# Patient Record
Sex: FEMALE | Race: BLACK OR AFRICAN AMERICAN | NOT HISPANIC OR LATINO | Employment: OTHER | URBAN - METROPOLITAN AREA
[De-identification: names, ages, dates, MRNs, and addresses within clinical notes are randomized per-mention and may not be internally consistent; named-entity substitution may affect disease eponyms.]

---

## 2019-05-02 ENCOUNTER — TRANSFERRED RECORDS (OUTPATIENT)
Dept: HEALTH INFORMATION MANAGEMENT | Facility: CLINIC | Age: 69
End: 2019-05-02

## 2019-05-06 ENCOUNTER — TRANSFERRED RECORDS (OUTPATIENT)
Dept: HEALTH INFORMATION MANAGEMENT | Facility: CLINIC | Age: 69
End: 2019-05-06

## 2019-05-08 ENCOUNTER — TELEPHONE (OUTPATIENT)
Dept: OPHTHALMOLOGY | Facility: CLINIC | Age: 69
End: 2019-05-08

## 2019-05-08 NOTE — TELEPHONE ENCOUNTER
[facility]    Phone Message    May a detailed message be left on voicemail: yes    Reason for Call: Other: Jeni  just wants to notify clinic that pt needs to know ahead of time her hosp f/u so that she can set up transportation.     Action Taken: Message routed to:  [facility]

## 2019-05-08 NOTE — TELEPHONE ENCOUNTER
[facility]    Phone Message    May a detailed message be left on voicemail: yes    Reason for Call: Other: per pt - stated [place] [facility]ved for high pressures in her eyes, please call pt to schedule a hospital f/u asap thanks     Action Taken: Message routed to:  [facility]

## 2019-05-08 NOTE — TELEPHONE ENCOUNTER
Note to facilitator to take into consideration transportation needs when scheduling    Note to facilitator to review intolerances pt having with current treatment also to review with MD's    Jose Hernandez RN 4:49 PM 05/08/19          [facility]    Phone Message    May a detailed message be left on voicemail: yes    Reason for Call: Other: Pt wishes us to know that she needs to use a transportation company and will need a couple days warnig to schedule anything.  Pt believes we have not been calling her back. Pt complains her medications are no longer handling the pain and she is having allergic reactions to them but her [facility] doctor tells her she has to keep taking the.  Please contact Pt ASAP     Action Taken: Message routed to:  [facility] eye clinic

## 2019-05-08 NOTE — TELEPHONE ENCOUNTER
Facilitator aware of referral and previously spoke to referring clinic  Will review notes with MD and call pt to schedule  Jose Hernandez RN 4:04 PM 05/08/19

## 2019-05-09 NOTE — TELEPHONE ENCOUNTER
[facility]    Phone Message    May a detailed message be left on voicemail: yes    Reason for Call: Other: Jeni from [facility] called again to FU on Pt scheduling.  They are all VERY anxious due to the extra added time for scheduling transport     Action Taken: Message routed to:  [facility] clinic

## 2019-05-09 NOTE — TELEPHONE ENCOUNTER
Dr. Martin as spoken with [facility] and working on plan for surgery    Jose Hernandez RN 11:46 AM 05/09/19

## 2019-05-09 NOTE — TELEPHONE ENCOUNTER
[facility] Call [facility]    Phone Message    May a detailed message be left on voicemail: no -  Per Richard Care Coordinator at [facility] if unable to reach her at cell phone number [phone], please try [phone] or [phone].     Reason for Call: Other: Richard Care Coordinator at [facility] for Pt is calling to see if there is an update on appointment/surgery for Pt. Please call Pt back to give her an update.      Action Taken: Message routed to:  [facility] Clinic

## 2019-05-10 ENCOUNTER — TELEPHONE (OUTPATIENT)
Dept: OPHTHALMOLOGY | Facility: CLINIC | Age: 69
End: 2019-05-10

## 2019-05-10 NOTE — TELEPHONE ENCOUNTER
[facility] patient - was referred to Mario via Epic messages (from Triage via multiple calls from the patient/family and their insurance), and via phone calls directly to the surgeon from [facility] staff.    Time is being held at the [facility] on 5/22 (11:15 - 12:15) for a poss phaco/tube to be done; this will be followed by a 1 day post op (on campus), and then patient will be re-referred back to [facility] for continuing care.    Daughter was called this morning to let her know the date for the surgery.  She mentioned to call her mother with all of the details - so that she can decide, and then could call her to discuss details.  Daughter has my direct line.    A long voicemail was also left on patient's phone this morning with all of the details regarding date/time of surgery, 1 day post op will be needed, and a preop will be needed.      [facility] has the patient coming in today to get IOL calcs/testing done.  I have scanned and emailed the surgery packet to Ashleigh Eddy, to give to the family/patient.      Mario will meet with the patient the morning of surgery, at the [facility], as they are not coming to our clinic prior to the surgery date.

## 2019-05-28 ENCOUNTER — TELEPHONE (OUTPATIENT)
Dept: OPHTHALMOLOGY | Facility: CLINIC | Age: 69
End: 2019-05-28

## 2019-05-28 NOTE — TELEPHONE ENCOUNTER
Pt seen at [facility] ED last night  Seizure last night    IOP ok per pt at visit     S/p tube glaucoma surgery   [facility] pt  Glaucoma surgery note Not in system    Pt provided will percocet yesterday--10 pills      Pt was having eye pain last night-- unsure if cup pt was holding hit eye   Increase pain this AM-- percocet helped only temporary  Vision worse today per pt  Recommend evaluation today    Reviewed with Dr. Martin-- surgery last weds  [facility] pt and follow up care with [facility]    Spoke to [facility] ([facility] spoke to pt this AM also) who will contact pt about appt this afternoon and assist in transportation request-- urgent request to transportation  [facility] has direct triage number for further assistance.  Jose Hernandez RN 8:50 AM 05/28/19    Note to Dr. Martin/Dr. Linda

## 2019-05-28 NOTE — TELEPHONE ENCOUNTER
Patient called reporting red-flag symptom: Pain after surgery    Per the P Red-Flag symptom list, patient was: refused to dial 911.  Will send message to clinic team as an FYI.    Pt requested a call back   [phone]    Excessive Pain - could barely speak

## 2019-08-13 ENCOUNTER — TRANSFERRED RECORDS (OUTPATIENT)
Dept: HEALTH INFORMATION MANAGEMENT | Facility: CLINIC | Age: 69
End: 2019-08-13

## 2019-12-09 ENCOUNTER — OFFICE VISIT (OUTPATIENT)
Dept: INTERNAL MEDICINE | Facility: CLINIC | Age: 69
End: 2019-12-09
Payer: COMMERCIAL

## 2019-12-09 VITALS
HEART RATE: 86 BPM | WEIGHT: 176.3 LBS | OXYGEN SATURATION: 98 % | TEMPERATURE: 98.5 F | DIASTOLIC BLOOD PRESSURE: 90 MMHG | SYSTOLIC BLOOD PRESSURE: 164 MMHG | HEIGHT: 62 IN | RESPIRATION RATE: 18 BRPM | BODY MASS INDEX: 32.44 KG/M2

## 2019-12-09 DIAGNOSIS — F19.10 POLYSUBSTANCE ABUSE (H): ICD-10-CM

## 2019-12-09 DIAGNOSIS — F11.90 CHRONIC NARCOTIC USE: Primary | ICD-10-CM

## 2019-12-09 PROCEDURE — 99203 OFFICE O/P NEW LOW 30 MIN: CPT | Performed by: INTERNAL MEDICINE

## 2019-12-09 RX ORDER — MELOXICAM 15 MG/1
15 TABLET ORAL DAILY
COMMUNITY
Start: 2019-12-09

## 2019-12-09 RX ORDER — ERYTHROMYCIN 5 MG/G
0.5 OINTMENT OPHTHALMIC 4 TIMES DAILY
COMMUNITY
Start: 2019-12-09

## 2019-12-09 RX ORDER — DORZOLAMIDE HYDROCHLORIDE AND TIMOLOL MALEATE 20; 5 MG/ML; MG/ML
1 SOLUTION/ DROPS OPHTHALMIC 2 TIMES DAILY
COMMUNITY
Start: 2019-12-09

## 2019-12-09 RX ORDER — LATANOPROST 50 UG/ML
1 SOLUTION/ DROPS OPHTHALMIC AT BEDTIME
COMMUNITY
Start: 2019-12-09

## 2019-12-09 RX ORDER — ACETAZOLAMIDE 500 MG/1
500 CAPSULE, EXTENDED RELEASE ORAL 2 TIMES DAILY
COMMUNITY
Start: 2019-12-09

## 2019-12-09 RX ORDER — PREDNISOLONE ACETATE 10 MG/ML
1 SUSPENSION/ DROPS OPHTHALMIC 4 TIMES DAILY
COMMUNITY
Start: 2019-12-09

## 2019-12-09 RX ORDER — GLUCOSAM/CHON-MSM1/C/MANG/BOSW 500-416.6
TABLET ORAL
COMMUNITY
Start: 2019-12-09

## 2019-12-09 RX ORDER — OXYCODONE AND ACETAMINOPHEN 5; 325 MG/1; MG/1
2 TABLET ORAL EVERY 4 HOURS PRN
Qty: 12 TABLET | Refills: 0 | Status: CANCELLED | COMMUNITY
Start: 2019-12-09

## 2019-12-09 RX ORDER — BISACODYL 10 MG
10 SUPPOSITORY, RECTAL RECTAL DAILY PRN
COMMUNITY
Start: 2017-11-20

## 2019-12-09 RX ORDER — POLYETHYLENE GLYCOL 3350 17 G/17G
1 POWDER, FOR SOLUTION ORAL DAILY
COMMUNITY
Start: 2019-12-09

## 2019-12-09 RX ORDER — NAPHAZOLINE HCL/GLYCERIN 0.012-0.2%
1 DROPS OPHTHALMIC (EYE) DAILY PRN
Refills: 0 | COMMUNITY
Start: 2019-06-07 | End: 2019-12-09

## 2019-12-09 RX ORDER — LISINOPRIL 40 MG/1
40 TABLET ORAL AT BEDTIME
COMMUNITY
Start: 2019-12-09

## 2019-12-09 RX ORDER — DOCUSATE SODIUM 100 MG/1
1 CAPSULE, LIQUID FILLED ORAL DAILY PRN
COMMUNITY

## 2019-12-09 RX ORDER — PHENYTOIN SODIUM 100 MG/1
CAPSULE, EXTENDED RELEASE ORAL
COMMUNITY
Start: 2019-12-09

## 2019-12-09 RX ORDER — OXYCODONE AND ACETAMINOPHEN 5; 325 MG/1; MG/1
2 TABLET ORAL EVERY 4 HOURS PRN
Qty: 12 TABLET | Refills: 0 | COMMUNITY
Start: 2019-12-09

## 2019-12-09 RX ORDER — ZONISAMIDE 100 MG/1
500 CAPSULE ORAL AT BEDTIME
COMMUNITY
Start: 2019-12-09

## 2019-12-09 RX ORDER — DOCUSATE SODIUM 100 MG/1
100 CAPSULE, LIQUID FILLED ORAL 2 TIMES DAILY PRN
Status: CANCELLED | COMMUNITY
Start: 2019-12-09

## 2019-12-09 RX ORDER — NAPHAZOLINE HCL/GLYCERIN 0.012-0.2%
1 DROPS OPHTHALMIC (EYE) 4 TIMES DAILY PRN
Refills: 0 | COMMUNITY
Start: 2019-12-09

## 2019-12-09 RX ORDER — LANCETS
EACH MISCELLANEOUS
COMMUNITY
Start: 2019-12-09

## 2019-12-09 RX ORDER — BRIMONIDINE TARTRATE 1 MG/ML
1 SOLUTION/ DROPS OPHTHALMIC 2 TIMES DAILY
COMMUNITY
Start: 2019-06-14

## 2019-12-09 SDOH — HEALTH STABILITY: MENTAL HEALTH: HOW OFTEN DO YOU HAVE A DRINK CONTAINING ALCOHOL?: NEVER

## 2019-12-09 ASSESSMENT — MIFFLIN-ST. JEOR: SCORE: 1282.94

## 2019-12-09 NOTE — Clinical Note
Pt had normal mammogram completed 8/13/19 through [facility]-results in Care Everywhere.

## 2019-12-09 NOTE — NURSING NOTE
"BP (!) 164/90 (BP Location: Left arm, Patient Position: Sitting, Cuff Size: Adult Large)   Pulse 86   Temp 98.5  F (36.9  C) (Oral)   Resp 18   Ht 1.575 m (5' 2\")   Wt 80 kg (176 lb 4.8 oz)   LMP  (LMP Unknown)   SpO2 98%   Breastfeeding No   BMI 32.25 kg/m    Margaret Moncada CMA    "

## 2019-12-09 NOTE — PROGRESS NOTES
"*Establish Care-She recenty moved from her apartment in with her daughter (said she wasn't safe to live home alone with her seizures). Daughter is her PCA.   -Her walker is broken, as well as her scooter. She would like Dr. Cornell to write a letter so they will fix her scooter faster?   -She reports that the Acetazolamide \"is wrong\". Says she met with a new doctor and was given this medication, but it is not the right one (did not specify why).   -Needs hospital bed-says her previous doctor ordered her one and they came to deliver it, but it was the wrong one and they had to take it back. Says she needs the right one ordered.   -Reports she has \"slight alzheimers\", has frequent seizures, hx of substance addiction (says this is why she is so careful with her oxycodone).     Subjective     Elsa Zapien is a 69 year old female who presents to clinic today for the following health issues:    HPI   Patient is here to establish care.  Patient came alone and was anxious from the beginning.  Patient has pupils of oxycodone and was worried if she could get that renewed especially with her past history of drug addiction including cocaine, heroine, alcohol abuse.  Patient says she has been sober for last 8 months.  Patient mentioned that she gets 120 pills of oxycodone to take 2 pills every 4 hours as needed in a day. That usually helps with her pain.  Patient has tried Vicodin, Tylenol 3 in the past and that did not help the pain.  When asked more about her pain she said she has knee pain and she is disabled and she needs a scooter for that.  Patient also had glaucoma surgery and has eye pain, had a tooth extraction and has stopped pain, had a head injury in the past and has headaches with that.  Patient also mentioned that she has dementia and has trouble remembering things.    Patient used to follow with Dr. Garay and and after Dr. Briones moved he was following with Dr. Rios and had difficulty getting pain " "medicine and that is why she is looking for a new primary care provider.    When I reviewed her previous drug screens for last 1 year none of the urine drug screen was positive for opiate or oxycodone.      Review of Systems   ROS COMP: CONSTITUTIONAL: NEGATIVE for fever, chills, change in weight  INTEGUMENTARY/SKIN: NEGATIVE for worrisome rashes, moles or lesions  EYES: POSITIVE for vision changes  ENT/MOUTH: NEGATIVE for ear, mouth and throat problems  RESP: NEGATIVE for significant cough or SOB  CV: NEGATIVE for chest pain, palpitations or peripheral edema  GI: NEGATIVE for nausea, abdominal pain, heartburn, or change in bowel habits  : NEGATIVE for frequency, dysuria, or hematuria  MUSCULOSKELETAL: POSITIVE  for significant knee arthralgias and back pain  NEURO: NEGATIVE for weakness, dizziness or paresthesias  ENDOCRINE: NEGATIVE for temperature intolerance, skin/hair changes  HEME: NEGATIVE for bleeding problems  PSYCHIATRIC: NEGATIVE for changes in mood or affect      Objective    BP (!) 164/90 (BP Location: Left arm, Patient Position: Sitting, Cuff Size: Adult Large)   Pulse 86   Temp 98.5  F (36.9  C) (Oral)   Resp 18   Ht 1.575 m (5' 2\")   Wt 80 kg (176 lb 4.8 oz)   LMP  (LMP Unknown)   SpO2 98%   Breastfeeding No   BMI 32.25 kg/m    Body mass index is 32.25 kg/m .  Physical Exam   GENERAL: healthy, alert and no distress  EYES: Eyes grossly normal to inspection, PERRL and conjunctivae and sclerae normal  CV: regular rate and rhythm, normal S1 S2, no S3 or S4, no murmur, click or rub, no peripheral edema and peripheral pulses strong  MS: knee: no joint line tenderness noted. ROM limited due to pain  SKIN: no suspicious lesions or rashes  NEURO: Normal strength and tone, mentation intact and speech normal  PSYCH: mentation appears normal, affect normal/bright    Diagnostic Test Results:  Labs reviewed in Epic        Assessment & Plan     Elsa was seen today for establish care.    Diagnoses and " all orders for this visit:    Chronic narcotic use    Polysubstance abuse (H)    When I reviewed her medical records from care everywhere was concerned about her use of narcotic pain medication.  I also checked her  and patient has been consistently getting around 120 pills of oxycodone and sometimes 168 pills for 21 days.  It was unclear why exactly she was using pain medication as she was going from the pain to glaucoma surgery to dental extraction for her reasons to use pain medication.  I explained in detail about the current recommendation and cutting back on the narcotic pain medication.  Also explained to her due to her age, possible dementia, addiction history, seizure narcotic pain medication can put her at risk of fall, respiratory depression, sudden death.  I also explained to her that it could be addictive.  Patient that she has been taking it for a long time and her only focus was if she would be able to get pain medications.  I was also concerned that none of the urine drug screen was positive for opioids or oxycodone in the past.  That was the reason the previous doctors stopped giving her pain medication.    I explained to her that I would not be recommending narcotic pain medication for above-mentioned reasons.  Patient said she will try to find a doctor who can give her medications and walked away.    No follow-ups on file.    Emily Cornell MD  [facility]

## 2020-06-25 ENCOUNTER — HOSPITAL ENCOUNTER (EMERGENCY)
Facility: CLINIC | Age: 70
Discharge: ANOTHER HOSPITAL | End: 2020-06-25
Attending: EMERGENCY MEDICINE | Admitting: EMERGENCY MEDICINE
Payer: COMMERCIAL

## 2020-06-25 VITALS
TEMPERATURE: 98.5 F | OXYGEN SATURATION: 99 % | RESPIRATION RATE: 20 BRPM | DIASTOLIC BLOOD PRESSURE: 82 MMHG | SYSTOLIC BLOOD PRESSURE: 120 MMHG

## 2020-06-25 DIAGNOSIS — H57.12 PAIN OF LEFT EYE: ICD-10-CM

## 2020-06-25 PROCEDURE — 93005 ELECTROCARDIOGRAM TRACING: CPT

## 2020-06-25 PROCEDURE — 99285 EMERGENCY DEPT VISIT HI MDM: CPT | Mod: 25

## 2020-06-25 RX ORDER — TETRACAINE HYDROCHLORIDE 5 MG/ML
SOLUTION OPHTHALMIC
Status: DISCONTINUED
Start: 2020-06-25 | End: 2020-06-25 | Stop reason: HOSPADM

## 2020-06-25 ASSESSMENT — ENCOUNTER SYMPTOMS
HEADACHES: 0
SPEECH DIFFICULTY: 0
WEAKNESS: 0
FEVER: 0
NUMBNESS: 0
EYE REDNESS: 1
PHOTOPHOBIA: 1
EYE PAIN: 1

## 2020-06-25 NOTE — ED PROVIDER NOTES
"  History     Chief Complaint:  Left Eye Pain    The history is provided by the patient and medical records. The history is limited by the condition of the patient and the absence of a caregiver.      Elsa Zapien is a 69 year old female with a history of glaucoma in both eyes, as well as Alzheimer's disease, who presents for evaluation of left eye pain. Approximately two weeks ago, the patient was diagnosed with a corneal abrasion in her right eye and she was started on multiple eye drops, including erythromycin ointment, at that time. Two days ago, she had an appointment with her ophthalmologist at [facility] and the abrasion was found to be healed without infection, however with a subconjunctival hemorrhage; at that time, she was endorsing pain and blurry vision in her right eye, but her left eye was stable. (per the clinic notes: her IOPs were 18 right eye 20 left eye that day, however her glaucoma was not the primary concern). This morning, the patient reports the onset of pain in her left eye. She describes a shooting pain, as well as blurry vision and photophobia. She took Tylenol and oxycodone earlier in the day, but neither provided relief of her symptoms. Given this persistent pain, she called her ophthalmology clinic and the nurse line recommended she present to the ED immediately. Her daughter, her PCA, drove her to the ED. Here, she only reports the left eye pain. She also notes that she had \"glaucoma surgery\" on both eyes a couple months ago. She denies any fever, headache, focal weakness, chest pain or other symptoms.     Allergies:  Diphtheria-Tetanus Toxoids  Droperidol  Penicillins  Tetanus Toxoid  Valproic Acid  Lacosamide  Lamotrigine  Tramadol    Medications:    Lisinopril   Dulcolax   Docusate  Magnesium citrate  Miralax   Zonegran   Dilantin   Nizatidine   Zanaflex  Brimonidine eye drops   Cosopt eye drops   Latanoprost eye drops   Carboxymethylcellulose drops   Erythromycin ophthalmic ointment "     Past Medical History:    Subconjunctival hemorrhage, right eye   Right corneal abrasion   Drug seeking behavior   Bipolar II disorder  Alzheimer's disease  Glaucoma, bilateral   Hypertension   Seizures   Chronic constipation   Chronic pain   Insomnia   Chronic hepatitis C  Sensorineural hearing loss   Diabetes     Past Surgical History:    Surgery for elbow fracture  GI ERCP with sphincterotomy x 2  GI ERCP with stone removal x 2  Cholecystectomy  Liver biopsy     Family History:    No past pertinent family history.      Social History:  Marital Status:  Single [1]  Presents with daughter.   Positive for tobacco use. Comment: 0.5 PPD.   Negative for alcohol use.   Negative for current drug use.      RoS is limited secondary to the patient's condition.   Review of Systems   Constitutional: Negative for fever.   Eyes: Positive for photophobia, pain, redness and visual disturbance.   Neurological: Negative for speech difficulty, weakness, numbness and headaches.       Physical Exam     Patient Vitals for the past 24 hrs:   BP Temp Temp src Heart Rate Resp SpO2   06/25/20 1735 (!) 127/107 98.5  F (36.9  C) Oral 70 20 99 %        Physical Exam  Nursing note and vitals reviewed.  Constitutional: Well nourished.   Eyes: Pupils are equal, round, and reactive to light; 3mm bilaterally. Exam somewhat limited 2/2 to patient cooperation  IOP: 27 left eye, 18 right eye.   Visual acuity: 20/50 bilaterally  Slit Lamp Exam:  -Scleral injection bilaterally  - EOMI  - No foreign body with eversion of the lids  -  No fluorescein uptake to suggest abrasion, and no Wesly sign  -subconjunctival hemorrhage to R. eye  ENT: Nose normal. Mucous membranes pink and moist.    Neck: Normal range of motion.  CVS: Normal rate, regular rhythm.  Normal heart sounds.   Pulmonary: Lungs clear to auscultation bilaterally.   GI: Abdomen soft. Nontender, nondistended.   MSK: No calf tenderness or swelling.  Neuro: Alert. Follows simple commands.   Moves all extremities equally and symmetrically  Skin: Skin is warm and dry. No rash noted.   Psychiatric: Anxious appearing        Emergency Department Course   ECG:  Indication: Eye Problem  Time: 1821  Vent. Rate 65 bpm. NH interval 160. QRS duration 90. QT/QTc 418/434. P-R-T axis 16 -15 37.    Normal sinus rhythm.  Minimal voltage criteria for LVH, may be normal variant.   Borderline ECG. Read time: 1821.    Interventions:  Fluorescein ophthalmic strip   Tetracaine 0.5% solution     Emergency Department Course:  Nursing notes and vitals reviewed.   1757: I performed an exam (including a slit lamp exam) of the patient as documented above.      EKG obtained in the ED, see results above.      1826: I consulted with ED physician at [facility] regarding the patient's history and presentation here in the emergency department. They will transfer me to the ophthalmologist.     1834: I consulted with Dr. Bynum, on-call ophthalmology at [facility], regarding the patient's history and presentation here in the emergency department. They report the plan will be to see the patient today.     1840: I discussed the current plan with the patient's daughter who has now arrived in the ED. She is comfortable taking the patient to [facility] by private car.    1841: I updated the ophthalmologist with the daughter's decision and she recommends ED to ED transfer and she will evaluate the patient in the ED.     1858: I spoke with Dr. Fried, ED physician at [facility], regarding the patient's presentation and imminent transfer to their ED. She will accept the patient to the ED with the plan for initial ophtho consult.     Findings and plan explained to the Patient and daughter. Patient will be transferred to [facility] ED via private car. Discussed the case with Dr. Fried, who will admit the patient to an ED bed for further monitoring, evaluation, and treatment.    I personally answered all related questions prior to transfer.    Impression & Plan      Medical  Decision Making:  Patient is a 69-year-old female with extensive past medical history and ophthalmologic history presenting with predominantly complaints of left eye pain.  Exam limited secondary to patient cooperation though patient does seem to have slightly increased intraocular pressure on the left compared to recorded pressures from 2 days prior.  She is currently being treated for corneal abrasion to her right eye though I do not see any evidence to suggest this at this time.  No evidence of globe rupture.  Patient does have significant scleral injection and given complicated ophthalmologic history I did reach out to [facility] ophthalmology regarding patient.  They stated they do not recommend doing any intervention at this time as they doubt worsening glaucoma is contributing to her presentation and would be happy to evaluate the patient.  The patient is otherwise neurologically intact, I doubt CVA, CRAO, CRVO.  There is no evidence to suggest orbital cellulitis, endophthalmitis today.  Patient and her daughter comfortable with plans to transfer to [facility] by private vehicle.  She will present to the ED and then will be seen by ophthalmology team.      Diagnosis:    ICD-10-CM    1. Pain of left eye  H57.12        Disposition:    Transferred to the ED at [facility] via private car     Scribe Disclosure:  I, Leonor Saleem, am serving as a scribe on 6/25/2020 at 5:57 PM to personally document services performed by Shey Chapin DO based on my observations and the provider's statements to me.       6/25/2020   [facility] EMERGENCY DEPARTMENT       Shey Chapin DO  06/25/20 1929

## 2020-06-25 NOTE — ED TRIAGE NOTES
"A&O x4, ABCs intact. Pt presents with left eye pain since yesterday. Pt states the pain shoots through her eyes and then goes away, and then shoots again. Pt reports that the pain is becoming more constant and worse. Pt states she is unable to look at light. Pt had \"double surgery on this eye maybe 2 months ago.\"   "

## 2020-06-25 NOTE — ED NOTES
Performed visual acuity on patient. Only could get left eye and she couldn't continue on with the examination because her eyes hurt too much. Left eye: 20/50

## 2020-06-26 LAB — INTERPRETATION ECG - MUSE: NORMAL

## 2021-01-12 ENCOUNTER — TRANSFERRED RECORDS (OUTPATIENT)
Dept: HEALTH INFORMATION MANAGEMENT | Facility: CLINIC | Age: 71
End: 2021-01-12

## 2021-01-21 ENCOUNTER — MEDICAL CORRESPONDENCE (OUTPATIENT)
Dept: HEALTH INFORMATION MANAGEMENT | Facility: CLINIC | Age: 71
End: 2021-01-21

## 2021-05-26 ENCOUNTER — TELEPHONE (OUTPATIENT)
Dept: NEUROLOGY | Facility: CLINIC | Age: 71
End: 2021-05-26

## 2021-05-26 NOTE — TELEPHONE ENCOUNTER
Pt was informally referred by [facility] back in January, please review telephone encounter from 1/19/21, and she would now like to make the appt. She said she is required to be checked before she moves in a week. Please call back asap to schedule a new [facility] appt.

## 2021-06-01 ENCOUNTER — RECORDS - HEALTHEAST (OUTPATIENT)
Dept: ADMINISTRATIVE | Facility: CLINIC | Age: 71
End: 2021-06-01

## 2021-07-06 ENCOUNTER — TELEPHONE (OUTPATIENT)
Dept: NEUROLOGY | Facility: CLINIC | Age: 71
End: 2021-07-06

## 2021-07-06 ENCOUNTER — VIRTUAL VISIT (OUTPATIENT)
Dept: NEUROLOGY | Facility: CLINIC | Age: 71
End: 2021-07-06
Payer: COMMERCIAL

## 2021-07-06 DIAGNOSIS — R29.6 FALLS FREQUENTLY: Primary | ICD-10-CM

## 2021-07-06 DIAGNOSIS — R29.6 FALLS FREQUENTLY: ICD-10-CM

## 2021-07-06 LAB — PHENYTOIN SERPL-MCNC: 18.1 MG/L (ref 10–20)

## 2021-07-06 PROCEDURE — 82565 ASSAY OF CREATININE: CPT | Performed by: PSYCHIATRY & NEUROLOGY

## 2021-07-06 PROCEDURE — 99000 SPECIMEN HANDLING OFFICE-LAB: CPT | Performed by: PSYCHIATRY & NEUROLOGY

## 2021-07-06 PROCEDURE — 84460 ALANINE AMINO (ALT) (SGPT): CPT | Performed by: PSYCHIATRY & NEUROLOGY

## 2021-07-06 PROCEDURE — 84450 TRANSFERASE (AST) (SGOT): CPT | Performed by: PSYCHIATRY & NEUROLOGY

## 2021-07-06 PROCEDURE — 80185 ASSAY OF PHENYTOIN TOTAL: CPT | Performed by: PSYCHIATRY & NEUROLOGY

## 2021-07-06 PROCEDURE — 36415 COLL VENOUS BLD VENIPUNCTURE: CPT | Performed by: PSYCHIATRY & NEUROLOGY

## 2021-07-06 PROCEDURE — 80186 ASSAY OF PHENYTOIN FREE: CPT | Mod: 90 | Performed by: PSYCHIATRY & NEUROLOGY

## 2021-07-06 RX ORDER — ONDANSETRON 4 MG/1
4 TABLET, FILM COATED ORAL EVERY 8 HOURS PRN
COMMUNITY

## 2021-07-06 ASSESSMENT — PATIENT HEALTH QUESTIONNAIRE - PHQ9: SUM OF ALL RESPONSES TO PHQ QUESTIONS 1-9: 19

## 2021-07-06 NOTE — PROGRESS NOTES
"lEsa is a 70 year old who is being evaluated via a billable video visit.      How would you like to obtain your AVS? MyChart  If the video visit is dropped, the invitation should be resent by: Text to cell phone: [phone]  Will anyone else be joining your video visit? Yes: Maybe daugther. How would they like to receive their invitation? Text to cell phone: cell      Video Start Time: 9:05 AM  Video-Visit Details    Type of service:  Video Visit    Video End Time:9:47 am     Originating Location (pt. Location): Home    Distant Location (provider location):  [facility]     Platform used for Video Visit: [employer]       [facility] [place] and Physical       Patient:  Elsa Zapien  :  1950   Age:  70 year old   Today's Office Visit:  2021    Referring Provider:    [facility]  [address] [place] [id number]    History of Present Illness:  Elsa Zapien is 70 year old right handed female with history of epilepsy and schizophrenia with recurrent seizure. History was obtained from Elsa, obtaining medical history was disorganized and challenging, and she did not recall some history. She does not know when her seizure started. She states family has found her \"on the floor, with bruises on my thigh, urinating in my bed, family says I stare off\". Patient denies dizziness, no double vision, no nausea, no vomiting, no abdominal pain, no mood changes, no ER visits, no hospitalizations, and had no significant fall with trauma. Last year she states she did not have seizure and almost was able to drive.   Seizure type 1: She does not have an aura. She has staring spells. She states \"granddaughter has told me I was making eatting movements and staring. My daughter tells me I get them 2-3 times per month\".   Seizure type 2: possible generalized tonic-clonic convulsion, she wakes up on the floor with bruises, tongue bite, and loss of urine. There was no " "family present to obtain further history. She thinks she has \"larger seizure\". She was not able to tell me frequency, but, states this happens rarely.   Epilepsy Risk Factors:  Patient has no history of encephalitis/meningitis, no history of stroke, no history of tumor, she hit her head 5/2021 \" I was told I had blood in my head\". I reviewed recent CT of head with no acute pathology. No family members with epilepsy.     Current antiepileptic drug: Phenytoin  160 mg morning and 200 mg evening per medical records. She does not know her pills. PCA (daughter) was not able to join visit.   Current Outpatient Medications   Medication Sig Dispense Refill     acetaZOLAMIDE (DIAMOX SEQUEL) 500 MG 12 hr capsule Take 1 capsule (500 mg) by mouth 2 times daily       bisacodyl (DULCOLAX) 10 MG suppository Place 10 mg rectally daily as needed for constipation       blood glucose (NO BRAND SPECIFIED) test strip Use to test blood sugar 1 times daily or as directed. Uses One Touch Ultra.       brimonidine (ALPHAGAN P) 0.1 % ophthalmic solution Place 1 drop Into the left eye 2 times daily       capsicum oleoresin (TRIXAICIN) 0.025 % external cream Apply topically 2 times daily as needed (Left knee pain)       docusate sodium (COLACE) 100 MG capsule Take 1 capsule by mouth daily as needed       dorzolamide-timolol (COSOPT) 2-0.5 % ophthalmic solution Place 1 drop Into the left eye 2 times daily       erythromycin (ROMYCIN) 5 MG/GM ophthalmic ointment Place 0.5 inches Into the left eye 4 times daily Apply to left eye after each application of eye drops (4 times per day)       Lancet Devices (ULTI-MADALYN AUTOMATIC) MISC Automatic lancing device       latanoprost (XALATAN) 0.005 % ophthalmic solution Place 1 drop into both eyes At Bedtime       lisinopril (PRINIVIL/ZESTRIL) 40 MG tablet Take 1 tablet (40 mg) by mouth At Bedtime       LUBRICANT EYE DROPS 0.5 % SOLN ophthalmic solution Place 1 drop Into the left eye 4 times daily as needed  " 0     meloxicam (MOBIC) 15 MG tablet Take 1 tablet (15 mg) by mouth daily       Multiple Vitamin (MULTIVITAMINS PO) Take 1 tablet by mouth daily       nicotine (NICOTROL) 10 MG inhaler Inhale 6-16 Cartridges into the lungs daily as needed for smoking cessation (Inhale 10mg every one hour as needed for nicotine cravings.)       ondansetron (ZOFRAN) 4 MG tablet Take 4 mg by mouth every 8 hours as needed for nausea       phenytoin (DILANTIN) 100 MG capsule Take 1 capsule (100 mg) by mouth every morning AND 2 capsules (200 mg) At Bedtime. Takes this along with 30mg tablets for a total dosage of 160mg in the morning, and 200mg in the evening.       phenytoin (DILANTIN) 30 MG capsule Take 2 capsules (60 mg) by mouth every morning Takes this along with 100mg tablets for a total dosage of 160mg in the morning, and 200mg in the evening.       polyethylene glycol (MIRALAX/GLYCOLAX) packet Take 17 g by mouth daily       prednisoLONE acetate (PRED FORTE) 1 % ophthalmic suspension Place 1 drop Into the left eye 4 times daily       TRUEplus Lancets 30G MISC Use to check blood sugar every day as directed.       zonisamide (ZONEGRAN) 100 MG capsule Take 5 capsules (500 mg) by mouth At Bedtime       magnesium citrate solution Take 296 mLs by mouth once for 1 dose If you don't have a bowel movement after 8 hours, then drink the second bottle. 296 mL 0     oxyCODONE-acetaminophen (PERCOCET) 5-325 MG tablet Take 2 tablets by mouth every 4 hours as needed for pain 12 tablet 0     ranitidine (ZANTAC) 150 MG tablet Take 1 tablet (150 mg) by mouth 2 times daily       Perceived AED Side Effects: No  Medication Notes:   AED Medication Compliance:  compliant most of the time  Past AEDs:  Not sure  AED - ANTIEPILEPTIC DRUGS 12/9/2019   zonisamide (Oral) 500 mg At Bedtime   phenytoin (Oral) Take 1 capsule (100 mg) by mouth every morning AND 2 capsules (200 mg) At Bedtime. Takes this along with 30mg tablets for a total dosage of 160mg in the  morning, and 200mg in the evening. (100 MG CAPS) + 60 mg QAM     Past Medical History:   Diagnosis Date     Alzheimer disease (H)      Diabetes (H)      Glaucoma      Hypertension      Seizures (H)       Psycho-Social History: Lives alone, her daughter lives upstairs. She has a history of chemical dependency (started using cocaine, heroin, alcohol for many years. She stopped drugs 7 years ago). She has gone to treatment center.  She is not working with psychiatrist. She smokes cigarettes.   In the last month she does have feelings of depression, + anhedonia, + feelings of excessive guilt/worthlessness, no suicidal ideations .  We reviewed importance of mental and emotional wellbeing and impact on health.   Driving:  Currently patient is:  Not driving.  Previous Evaluations for Epilepsy:   EEG: None on file   MRI of Brain: none on file   CT of Head: 6/27/2021: Findings: No acute intracranial hemorrhage, mass effect, or abnormal extraaxial fluid collection. The parenchymal volume is age-appropriate;  the ventricles and sulci are proportional and have not substantially changed in caliber. Gray-white matter differentiation appears preserved throughout both cerebral hemispheres.    No convincing acute external soft tissue swelling. No acute abnormality of the bones of the calvaria or bones of the skull base. Multiple periapical lucencies are partially visualized, suggestive of odontogenic disease. The visualized portions of the paranasal sinuses. Trace opacification of a few right mastoid air cells. The left mastoid air cells appear clear. No acute or suspicious intraorbital findings; changes of pseudophakia and glaucoma drainage device placement on the left. Calcific atherosclerosis of the carotid siphons. Atlantodental degenerative changes.  Exam:    LMP  (LMP Unknown)      Wt Readings from Last 5 Encounters:   12/09/19 176 lb 4.8 oz (80 kg)   11/16/15 220 lb (99.8 kg)   Limited exam completed over video. Alert, speech  is fluent, face symmetric, tongue midline, extra ocular movements in tact, no pronator drip.     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  Visit today was disorganized and I was not able to get a lot of information from birth.  Her daughter was not available for the visit.  We will have to reschedule another visit to obtain more information.    Spells are described as staring with chewing mouth movements and waking up on the floor with bruises and tongue bites.  Clinical presentation seems consistent with focal epilepsy with impaired awareness and secondary generalization.  She does not have any EEG on file or MRI of the brain on file.  She is on monotherapy phenytoin with levels in 2019 of 15.  We need to obtain more EEG data.  I recommended inpatient video EEG evaluation and she adamantly refused.  We will complete an ambulatory EEG to evaluate seizure burden.  MRI of the brain per epilepsy protocol and labs will also be helpful.  I suspect she has focal epilepsy,  we may consider antiepileptic drugs such as oxcarbazepine, lamotrigine, Vimpat, gabapentin, Lyrica, she does have increased falls and seizure medications may worsen her falls.     Plan :   Ambulatory 48 hours   MRI brain at [facility] day of EEG visit and Banks Visit   [facility]in [facility]   She refused inpatient Video EEG evaluation   Follow up with Dr. Banks after MRI brain and EEG   She will need neuropsychology test, past medical records states she has Alzheimer's disease but this is not clear to me.    I spent 48 minutes with the patient. During this time medical history data collection, counseling, and coordination of care exceeded 50% of the visit  "time. I addressed all questions the patient/caregiver raised in regards to the patient's medical care. This note was created with voice recognition software. Inadvertent grammatical errors, typographical errors, and \"sound a like\" substitutions may occur due to limitations of the software.  Read the note carefully and apply context when erroneous substitutions have occurred. Thank you.     Luna Banks MD   Epilepsy Staff           "

## 2021-07-06 NOTE — TELEPHONE ENCOUNTER
What is the concern that needs to be addressed by a nurse? Selina, called, pt needs ambulatory eeg and MRI. Please call Selina back to schedule.     May a detailed message be left on voicemail? yes    Date of last office visit: 07/06/21    Message routed to: kar

## 2021-07-06 NOTE — LETTER
"2021     RE: Elsa Zapien  : 1950   MRN: [id number]      Dear Colleague,    Thank you for referring your patient, Elsa Zapien, to the [facility] EPILEPSY CARE at [facility]. Please see a copy of my visit note below.    Elsa is a 70 year old who is being evaluated via a billable video visit.      How would you like to obtain your AVS? MyChart  If the video visit is dropped, the invitation should be resent by: Text to cell phone: [phone]  Will anyone else be joining your video visit? Yes: Maybe daugther. How would they like to receive their invitation? Text to cell phone: cell      Video Start Time: 9:05 AM  Video-Visit Details    Type of service:  Video Visit    Video End Time:9:47 am     Originating Location (pt. Location): Home    Distant Location (provider location):  [facility] EPILEPSY CARE     Platform used for Video Visit: [employer]       [facility] Ep[facility]psy Care History and Physical       Patient:  Elsa Zapien  :  1950   Age:  70 year old   Today's Office Visit:  2021    Referring Provider:    [facility]  [address] [place] [address]    History of Present Illness:  Elsa Zapien is 70 year old right handed female with history of epilepsy and schizophrenia with recurrent seizure. History was obtained from Elsa, obtaining medical history was disorganized and challenging, and she did not recall some history. She does not know when her seizure started. She states family has found her \"on the floor, with bruises on my thigh, urinating in my bed, family says I stare off\". Patient denies dizziness, no double vision, no nausea, no vomiting, no abdominal pain, no mood changes, no ER visits, no hospitalizations, and had no significant fall with trauma. Last year she states she did not have seizure and almost was able to drive.   Seizure type 1: She does not have an aura. She has staring spells. " "She states \"granddaughter has told me I was making eatting movements and staring. My daughter tells me I get them 2-3 times per month\".   Seizure type 2: possible generalized tonic-clonic convulsion, she wakes up on the floor with bruises, tongue bite, and loss of urine. There was no family present to obtain further history. She thinks she has \"larger seizure\". She was not able to tell me frequency, but, states this happens rarely.   Epilepsy Risk Factors:  Patient has no history of encephalitis/meningitis, no history of stroke, no history of tumor, she hit her head 5/2021 \" I was told I had blood in my head\". I reviewed recent CT of head with no acute pathology. No family members with epilepsy.     Current antiepileptic drug: Phenytoin  160 mg morning and 200 mg evening per medical records. She does not know her pills. PCA (daughter) was not able to join visit.   Current Outpatient Medications   Medication Sig Dispense Refill     acetaZOLAMIDE (DIAMOX SEQUEL) 500 MG 12 hr capsule Take 1 capsule (500 mg) by mouth 2 times daily       bisacodyl (DULCOLAX) 10 MG suppository Place 10 mg rectally daily as needed for constipation       blood glucose (NO BRAND SPECIFIED) test strip Use to test blood sugar 1 times daily or as directed. Uses One Touch Ultra.       brimonidine (ALPHAGAN P) 0.1 % ophthalmic solution Place 1 drop Into the left eye 2 times daily       capsicum oleoresin (TRIXAICIN) 0.025 % external cream Apply topically 2 times daily as needed (Left knee pain)       docusate sodium (COLACE) 100 MG capsule Take 1 capsule by mouth daily as needed       dorzolamide-timolol (COSOPT) 2-0.5 % ophthalmic solution Place 1 drop Into the left eye 2 times daily       erythromycin (ROMYCIN) 5 MG/GM ophthalmic ointment Place 0.5 inches Into the left eye 4 times daily Apply to left eye after each application of eye drops (4 times per day)       Lancet Devices (ULTI-MADALYN AUTOMATIC) MISC Automatic lancing device       " latanoprost (XALATAN) 0.005 % ophthalmic solution Place 1 drop into both eyes At Bedtime       lisinopril (PRINIVIL/ZESTRIL) 40 MG tablet Take 1 tablet (40 mg) by mouth At Bedtime       LUBRICANT EYE DROPS 0.5 % SOLN ophthalmic solution Place 1 drop Into the left eye 4 times daily as needed  0     meloxicam (MOBIC) 15 MG tablet Take 1 tablet (15 mg) by mouth daily       Multiple Vitamin (MULTIVITAMINS PO) Take 1 tablet by mouth daily       nicotine (NICOTROL) 10 MG inhaler Inhale 6-16 Cartridges into the lungs daily as needed for smoking cessation (Inhale 10mg every one hour as needed for nicotine cravings.)       ondansetron (ZOFRAN) 4 MG tablet Take 4 mg by mouth every 8 hours as needed for nausea       phenytoin (DILANTIN) 100 MG capsule Take 1 capsule (100 mg) by mouth every morning AND 2 capsules (200 mg) At Bedtime. Takes this along with 30mg tablets for a total dosage of 160mg in the morning, and 200mg in the evening.       phenytoin (DILANTIN) 30 MG capsule Take 2 capsules (60 mg) by mouth every morning Takes this along with 100mg tablets for a total dosage of 160mg in the morning, and 200mg in the evening.       polyethylene glycol (MIRALAX/GLYCOLAX) packet Take 17 g by mouth daily       prednisoLONE acetate (PRED FORTE) 1 % ophthalmic suspension Place 1 drop Into the left eye 4 times daily       TRUEplus Lancets 30G MISC Use to check blood sugar every day as directed.       zonisamide (ZONEGRAN) 100 MG capsule Take 5 capsules (500 mg) by mouth At Bedtime       magnesium citrate solution Take 296 mLs by mouth once for 1 dose If you don't have a bowel movement after 8 hours, then drink the second bottle. 296 mL 0     oxyCODONE-acetaminophen (PERCOCET) 5-325 MG tablet Take 2 tablets by mouth every 4 hours as needed for pain 12 tablet 0     ranitidine (ZANTAC) 150 MG tablet Take 1 tablet (150 mg) by mouth 2 times daily       Perceived AED Side Effects: No  Medication Notes:   AED Medication Compliance:   compliant most of the time  Past AEDs:  Not sure  AED - ANTIEPILEPTIC DRUGS 12/9/2019   zonisamide (Oral) 500 mg At Bedtime   phenytoin (Oral) Take 1 capsule (100 mg) by mouth every morning AND 2 capsules (200 mg) At Bedtime. Takes this along with 30mg tablets for a total dosage of 160mg in the morning, and 200mg in the evening. (100 MG CAPS) + 60 mg QAM     Past Medical History:   Diagnosis Date     Alzheimer disease (H)      Diabetes (H)      Glaucoma      Hypertension      Seizures (H)       Psycho-Social History: Lives alone, her daughter lives upstairs. She has a history of chemical dependency (started using cocaine, heroin, alcohol for many years. She stopped drugs 7 years ago). She has gone to treatment center.  She is not working with psychiatrist. She smokes cigarettes.   In the last month she does have feelings of depression, + anhedonia, + feelings of excessive guilt/worthlessness, no suicidal ideations .  We reviewed importance of mental and emotional wellbeing and impact on health.   Driving:  Currently patient is:  Not driving.  Previous Evaluations for Epilepsy:   EEG: None on file   MRI of Brain: none on file   CT of Head: 6/27/2021: Findings: No acute intracranial hemorrhage, mass effect, or abnormal extraaxial fluid collection. The parenchymal volume is age-appropriate;  the ventricles and sulci are proportional and have not substantially changed in caliber. Gray-white matter differentiation appears preserved throughout both cerebral hemispheres.    No convincing acute external soft tissue swelling. No acute abnormality of the bones of the calvaria or bones of the skull base. Multiple periapical lucencies are partially visualized, suggestive of odontogenic disease. The visualized portions of the paranasal sinuses. Trace opacification of a few right mastoid air cells. The left mastoid air cells appear clear. No acute or suspicious intraorbital findings; changes of pseudophakia and glaucoma drainage  device placement on the left. Calcific atherosclerosis of the carotid siphons. Atlantodental degenerative changes.  Exam:    LMP  (LMP Unknown)      Wt Readings from Last 5 Encounters:   12/09/19 176 lb 4.8 oz (80 kg)   11/16/15 220 lb (99.8 kg)   Limited exam completed over video. Alert, speech is fluent, face symmetric, tongue midline, extra ocular movements in tact, no pronator drip.     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  Visit today was disorganized and I was not able to get a lot of information from birth.  Her daughter was not available for the visit.  We will have to reschedule another visit to obtain more information.    Spells are described as staring with chewing mouth movements and waking up on the floor with bruises and tongue bites.  Clinical presentation seems consistent with focal epilepsy with impaired awareness and secondary generalization.  She does not have any EEG on file or MRI of the brain on file.  She is on monotherapy phenytoin with levels in 2019 of 15.  We need to obtain more EEG data.  I recommended inpatient video EEG evaluation and she adamantly refused.  We will complete an ambulatory EEG to evaluate seizure burden.  MRI of the brain per epilepsy protocol and labs will also be helpful.  I suspect she has focal epilepsy,  we may consider antiepileptic drugs such as oxcarbazepine, lamotrigine, Vimpat, gabapentin, Lyrica, she does have increased falls and seizure medications may worsen her falls.     Plan :   Ambulatory 48 hours   MRI brain at [facility] day of EEG visit and Banks Visit   Check phenytoin levels at [facility]   She refused inpatient Video EEG evaluation   Follow up with Dr. Banks  "after MRI brain and EEG   She will need neuropsychology test, past medical records states she has Alzheimer's disease but this is not clear to me.    I spent 48 minutes with the patient. During this time medical history data collection, counseling, and coordination of care exceeded 50% of the visit time. I addressed all questions the patient/caregiver raised in regards to the patient's medical care. This note was created with voice recognition software. Inadvertent grammatical errors, typographical errors, and \"sound a like\" substitutions may occur due to limitations of the software.  Read the note carefully and apply context when erroneous substitutions have occurred. Thank you.     Luna Banks MD   Epilepsy Staff     "

## 2021-07-06 NOTE — PATIENT INSTRUCTIONS
At your visit today, we discussed your risk for falls and preventive options.      Ambulatory 48 hours  Day. Ambulatory EEG at [facility]. Then on 2nd day go to [facility] to fixed electrodes and download EEG data. Lastly, 3rd day go to [facility] to take EEG leads off.   MRI brain at [facility]    Check phenytoin levels at [facility]/hospital   Think about completing inpatient Video EEG evaluation (5-10 hospital stay, this will help us understand where seizure start and we can make some seizure medications changes)   Follow up with Dr. Banks after MRI brain and EEG   Luna Banks MD     Fall Prevention  Falls often occur due to slipping, tripping or losing your balance. Millions of people fall every year and injure themselves. Here are ways to reduce your risk of falling again.     Think about your fall, was there anything that caused your fall that can be fixed, removed, or replaced?    Make your home safe by keeping walkways clear of objects you may trip over, such as electric cords.    Use non-slip pads under rugs. Don't use area rugs or small throw rugs.    Use non-slip mats in bathtubs and showers.    Install handrails and lights on staircases. The handrails should be on both sides of the stairs.    Don't walk in poorly lit areas.    Don't stand on chairs or wobbly ladders.    Use caution when reaching overhead or looking upward. This position can cause a loss of balance.    Be sure your shoes fit properly, have non-slip bottoms and are in good condition.     Wear shoes both inside and out. Don't go barefoot or wear slippers.    Be cautious when going up and down stairs, curbs, and when walking on uneven sidewalks.    If your balance is poor, consider using a cane or walker.    If your fall was related to alcohol use, stop or limit alcohol intake.     If your fall was related to use of sleeping medicines, talk to your healthcare provider about this. You may need to reduce your dosage at bedtime if you awaken during the  night to go to the bathroom.      To reduce the need for nighttime bathroom trips:  ? Don't drink fluids for several hours before going to bed  ? Empty your bladder before going to bed  ? Men can keep a urinal at the bedside    Stay as active as you can. Balance, flexibility, strength, and endurance all come from exercise. They all play a role in preventing falls. Ask your healthcare provider which types of activity are right for you.    Get your vision checked on a regular basis.    If you have pets, know where they are before you stand up or walk so you don't trip over them.    Use night lights.    Go over all your medicines with a pharmacist or other healthcare provider to see if any of them could make you more likely to fall.  [employer] last reviewed this educational content on 4/1/2018 2000-2021 [employer]. All rights reserved. This information is not intended as a substitute for professional medical care. Always follow your healthcare professional's instructions.

## 2021-07-06 NOTE — Clinical Note
Please call her daughter PCA to schedule tests and review plan of care. Elsa has some memory issues and I do not think we she will be able to complete recommended workup. Thanks. Darren

## 2021-07-07 LAB
ALT SERPL W P-5'-P-CCNC: 48 U/L (ref 0–50)
AST SERPL W P-5'-P-CCNC: 45 U/L (ref 0–45)
CREAT SERPL-MCNC: 0.71 MG/DL (ref 0.52–1.04)
GFR SERPL CREATININE-BSD FRML MDRD: 87 ML/MIN/{1.73_M2}

## 2021-07-08 LAB — PHENYTOIN FREE SERPL-MCNC: 1.85 UG/ML (ref 1–2)

## 2021-07-10 ENCOUNTER — HEALTH MAINTENANCE LETTER (OUTPATIENT)
Age: 71
End: 2021-07-10

## 2021-09-04 ENCOUNTER — HEALTH MAINTENANCE LETTER (OUTPATIENT)
Age: 71
End: 2021-09-04

## 2021-10-15 ENCOUNTER — OFFICE VISIT (OUTPATIENT)
Dept: URGENT CARE | Facility: URGENT CARE | Age: 71
End: 2021-10-15
Payer: COMMERCIAL

## 2021-10-15 VITALS
OXYGEN SATURATION: 98 % | TEMPERATURE: 98.2 F | HEART RATE: 80 BPM | RESPIRATION RATE: 22 BRPM | DIASTOLIC BLOOD PRESSURE: 88 MMHG | SYSTOLIC BLOOD PRESSURE: 180 MMHG

## 2021-10-15 DIAGNOSIS — R09.89 RUNNY NOSE: Primary | ICD-10-CM

## 2021-10-15 PROCEDURE — 99207 PR NO CHARGE LOS: CPT | Performed by: FAMILY MEDICINE

## 2021-10-15 PROCEDURE — U0003 INFECTIOUS AGENT DETECTION BY NUCLEIC ACID (DNA OR RNA); SEVERE ACUTE RESPIRATORY SYNDROME CORONAVIRUS 2 (SARS-COV-2) (CORONAVIRUS DISEASE [COVID-19]), AMPLIFIED PROBE TECHNIQUE, MAKING USE OF HIGH THROUGHPUT TECHNOLOGIES AS DESCRIBED BY CMS-2020-01-R: HCPCS | Performed by: FAMILY MEDICINE

## 2021-10-15 PROCEDURE — U0005 INFEC AGEN DETEC AMPLI PROBE: HCPCS | Performed by: FAMILY MEDICINE

## 2021-10-15 NOTE — PROGRESS NOTES
Patient could not wait to be seen by a provider today because she had to catch a ride immediately after undergoing the COVID-19 test.  Patient will be called with the COVID-19 result.  .  .      Javi Goldman MD

## 2021-10-17 LAB — SARS-COV-2 RNA RESP QL NAA+PROBE: NEGATIVE

## 2021-10-30 ENCOUNTER — HEALTH MAINTENANCE LETTER (OUTPATIENT)
Age: 71
End: 2021-10-30

## 2022-06-11 ENCOUNTER — HEALTH MAINTENANCE LETTER (OUTPATIENT)
Age: 72
End: 2022-06-11

## 2022-07-21 DIAGNOSIS — E87.5 HYPERKALEMIA: Primary | ICD-10-CM

## 2022-07-22 ENCOUNTER — LAB (OUTPATIENT)
Dept: LAB | Facility: CLINIC | Age: 72
End: 2022-07-22
Payer: COMMERCIAL

## 2022-07-22 DIAGNOSIS — E87.5 HIGH POTASSIUM: ICD-10-CM

## 2022-07-22 DIAGNOSIS — E87.5 HIGH POTASSIUM: Primary | ICD-10-CM

## 2022-07-22 DIAGNOSIS — E87.5 HYPERKALEMIA: ICD-10-CM

## 2022-07-22 PROCEDURE — 36415 COLL VENOUS BLD VENIPUNCTURE: CPT

## 2022-07-22 PROCEDURE — 80048 BASIC METABOLIC PNL TOTAL CA: CPT

## 2022-07-23 LAB
ANION GAP SERPL CALCULATED.3IONS-SCNC: 7 MMOL/L (ref 3–14)
BUN SERPL-MCNC: 18 MG/DL (ref 7–30)
CALCIUM SERPL-MCNC: 9.3 MG/DL (ref 8.5–10.1)
CHLORIDE BLD-SCNC: 106 MMOL/L (ref 94–109)
CO2 SERPL-SCNC: 22 MMOL/L (ref 20–32)
CREAT SERPL-MCNC: 0.64 MG/DL (ref 0.52–1.04)
GFR SERPL CREATININE-BSD FRML MDRD: >90 ML/MIN/1.73M2
GLUCOSE BLD-MCNC: 136 MG/DL (ref 70–99)
POTASSIUM BLD-SCNC: 4.3 MMOL/L (ref 3.4–5.3)
SODIUM SERPL-SCNC: 135 MMOL/L (ref 133–144)

## 2022-08-06 ENCOUNTER — HEALTH MAINTENANCE LETTER (OUTPATIENT)
Age: 72
End: 2022-08-06

## 2022-10-16 ENCOUNTER — HEALTH MAINTENANCE LETTER (OUTPATIENT)
Age: 72
End: 2022-10-16

## 2023-08-26 ENCOUNTER — HEALTH MAINTENANCE LETTER (OUTPATIENT)
Age: 73
End: 2023-08-26

## 2023-11-04 ENCOUNTER — HEALTH MAINTENANCE LETTER (OUTPATIENT)
Age: 73
End: 2023-11-04

## 2024-08-29 ENCOUNTER — APPOINTMENT (OUTPATIENT)
Dept: GENERAL RADIOLOGY | Facility: CLINIC | Age: 74
End: 2024-08-29
Attending: EMERGENCY MEDICINE
Payer: COMMERCIAL

## 2024-08-29 ENCOUNTER — HOSPITAL ENCOUNTER (EMERGENCY)
Facility: CLINIC | Age: 74
Discharge: HOME | End: 2024-08-29
Attending: EMERGENCY MEDICINE | Admitting: EMERGENCY MEDICINE
Payer: COMMERCIAL

## 2024-08-29 ENCOUNTER — APPOINTMENT (OUTPATIENT)
Dept: ULTRASOUND IMAGING | Facility: CLINIC | Age: 74
End: 2024-08-29
Attending: EMERGENCY MEDICINE
Payer: COMMERCIAL

## 2024-08-29 VITALS
HEART RATE: 68 BPM | OXYGEN SATURATION: 100 % | SYSTOLIC BLOOD PRESSURE: 164 MMHG | RESPIRATION RATE: 18 BRPM | DIASTOLIC BLOOD PRESSURE: 82 MMHG | TEMPERATURE: 97.6 F

## 2024-08-29 DIAGNOSIS — R22.31 LOCALIZED SWELLING ON RIGHT HAND: ICD-10-CM

## 2024-08-29 DIAGNOSIS — R93.89 ABNORMAL ULTRASOUND: ICD-10-CM

## 2024-08-29 LAB
BASOPHILS # BLD AUTO: 0 10E3/UL (ref 0–0.2)
BASOPHILS NFR BLD AUTO: 1 %
CRP SERPL-MCNC: <3 MG/L
EOSINOPHIL # BLD AUTO: 0.1 10E3/UL (ref 0–0.7)
EOSINOPHIL NFR BLD AUTO: 1 %
ERYTHROCYTE [DISTWIDTH] IN BLOOD BY AUTOMATED COUNT: 13.7 % (ref 10–15)
ERYTHROCYTE [SEDIMENTATION RATE] IN BLOOD BY WESTERGREN METHOD: 39 MM/HR (ref 0–30)
HCT VFR BLD AUTO: 35 % (ref 35–47)
HGB BLD-MCNC: 11.3 G/DL (ref 11.7–15.7)
IMM GRANULOCYTES # BLD: 0 10E3/UL
IMM GRANULOCYTES NFR BLD: 0 %
LYMPHOCYTES # BLD AUTO: 2.2 10E3/UL (ref 0.8–5.3)
LYMPHOCYTES NFR BLD AUTO: 33 %
MCH RBC QN AUTO: 30.7 PG (ref 26.5–33)
MCHC RBC AUTO-ENTMCNC: 32.3 G/DL (ref 31.5–36.5)
MCV RBC AUTO: 95 FL (ref 78–100)
MONOCYTES # BLD AUTO: 0.4 10E3/UL (ref 0–1.3)
MONOCYTES NFR BLD AUTO: 6 %
NEUTROPHILS # BLD AUTO: 4.1 10E3/UL (ref 1.6–8.3)
NEUTROPHILS NFR BLD AUTO: 60 %
NRBC # BLD AUTO: 0 10E3/UL
NRBC BLD AUTO-RTO: 0 /100
PLATELET # BLD AUTO: 205 10E3/UL (ref 150–450)
RBC # BLD AUTO: 3.68 10E6/UL (ref 3.8–5.2)
WBC # BLD AUTO: 6.9 10E3/UL (ref 4–11)

## 2024-08-29 PROCEDURE — 99284 EMERGENCY DEPT VISIT MOD MDM: CPT | Mod: 25

## 2024-08-29 PROCEDURE — 85041 AUTOMATED RBC COUNT: CPT | Performed by: EMERGENCY MEDICINE

## 2024-08-29 PROCEDURE — 73130 X-RAY EXAM OF HAND: CPT | Mod: RT

## 2024-08-29 PROCEDURE — 93971 EXTREMITY STUDY: CPT | Mod: RT

## 2024-08-29 PROCEDURE — 250N000013 HC RX MED GY IP 250 OP 250 PS 637: Performed by: EMERGENCY MEDICINE

## 2024-08-29 PROCEDURE — 86140 C-REACTIVE PROTEIN: CPT | Performed by: EMERGENCY MEDICINE

## 2024-08-29 PROCEDURE — 85652 RBC SED RATE AUTOMATED: CPT | Performed by: EMERGENCY MEDICINE

## 2024-08-29 PROCEDURE — 36415 COLL VENOUS BLD VENIPUNCTURE: CPT | Performed by: EMERGENCY MEDICINE

## 2024-08-29 RX ORDER — OXYCODONE HYDROCHLORIDE 5 MG/1
5 TABLET ORAL ONCE
Status: COMPLETED | OUTPATIENT
Start: 2024-08-29 | End: 2024-08-29

## 2024-08-29 RX ADMIN — OXYCODONE HYDROCHLORIDE 5 MG: 5 TABLET ORAL at 09:21

## 2024-08-29 ASSESSMENT — COLUMBIA-SUICIDE SEVERITY RATING SCALE - C-SSRS
6. HAVE YOU EVER DONE ANYTHING, STARTED TO DO ANYTHING, OR PREPARED TO DO ANYTHING TO END YOUR LIFE?: NO
1. IN THE PAST MONTH, HAVE YOU WISHED YOU WERE DEAD OR WISHED YOU COULD GO TO SLEEP AND NOT WAKE UP?: NO
2. HAVE YOU ACTUALLY HAD ANY THOUGHTS OF KILLING YOURSELF IN THE PAST MONTH?: NO

## 2024-08-29 ASSESSMENT — ACTIVITIES OF DAILY LIVING (ADL)
ADLS_ACUITY_SCORE: 35

## 2024-08-29 NOTE — ED NOTES
Daughter, Seilna, has to leave. Selina can be reached at [phone]. Per Selina, pt has been transferred home via wheelchair van before. Pt address updated in demographics and is correct. Selina states that pt is usually able to have a ride arranged through her insurance. Per Selina, pt makes her own medical decisions.

## 2024-08-29 NOTE — ED PROVIDER NOTES
Emergency Department Note      History of Present Illness     Chief Complaint   Hand Pain      HPI   Elsa Zapien is a 73 year old female with history of type 2 diabetes mellitus, Alzheimer disease, hypertension, and seizures who presents to the ED for evaluation of right hand pain. Elsa reports 1 month ago she moved a mattress and since then her right wrist has been getting progressively more painful. She scores her pain 12/10. She endorses numbness, tingling, and burning sensation in her right hand. It recently started swelling as well. She had an MRI of her right neck at [facility] and was found to have a pinched nerve. She also had an XR of her right shoulder at [facility] in [place]. She has been using oxycodone, which she states only puts her to sleep, lidocaine patches, and she was on a short course of methylprednisolone. Patient does not have history of rheumatoid arthritis but her daughter does.    Independent Historian   None    Review of External Notes   Reviewed 8/19/2024 office visit    Past Medical History     Medical History and Problem List   Alzheimer disease  Type 2 diabetes mellitus  Hypertension  Seizures  Bipolar II disorder  Choledocholithiasis  Idiopathic acute pancreatitis  Drug-seeking behavior  Frequent falls  Chronic Hepatitis C  MDD  Mild TBI  Non intractable headache  Polysubstance dependence  Insomnia  SNHL  Thyroid nodule  Tobacco use disorder    Medications   Acetazolamide  Lisinopril  Meloxicam  Phenytoin  Ranitidine  Zonisamide    Surgical History   ORIF elbow  Cholecystectomy  SLT right eye    Physical Exam     Patient Vitals for the past 24 hrs:   BP Temp Temp src Pulse Resp SpO2   08/29/24 1355 (!) 164/82 -- -- 68 18 100 %   08/29/24 0842 (!) 159/80 97.6  F (36.4  C) Temporal 62 18 100 %     Physical Exam  Constitutional:  Alert, attentive  HENT:    Nose: Nose normal.    Mouth/Throat: Oropharynx is clear, mucous membranes are moist  Eyes:    EOM are  normal.  CV:    regular rate and rhythm; no murmurs, rubs or gallups. 2+ radial and ulnar pulses to the bilateral upper extremities   Chest:   Effort normal and breath sounds normal.   GI:     There is no tenderness. No distension. Normal bowel sounds  Neurological:  5/5 strength to the radian, ulnar and median motor distributions;      sensation intact to light touch to the radian, ulnar and median distributions  MSK:   Mild swelling to the right hand dorsum and palmar aspect, no significant swelling of the fingers.  Patient reports reduced range of motion due to pain.  No pain out of portion exam.  No asymmetric warmth or erythema.  Skin:    Skin is warm and dry.       Diagnostics     Lab Results   Labs Ordered and Resulted from Time of ED Arrival to Time of ED Departure   ERYTHROCYTE SEDIMENTATION RATE AUTO - Abnormal       Result Value    Erythrocyte Sedimentation Rate 39 (*)    CBC WITH PLATELETS AND DIFFERENTIAL - Abnormal    WBC Count 6.9      RBC Count 3.68 (*)     Hemoglobin 11.3 (*)     Hematocrit 35.0      MCV 95      MCH 30.7      MCHC 32.3      RDW 13.7      Platelet Count 205      % Neutrophils 60      % Lymphocytes 33      % Monocytes 6      % Eosinophils 1      % Basophils 1      % Immature Granulocytes 0      NRBCs per 100 WBC 0      Absolute Neutrophils 4.1      Absolute Lymphocytes 2.2      Absolute Monocytes 0.4      Absolute Eosinophils 0.1      Absolute Basophils 0.0      Absolute Immature Granulocytes 0.0      Absolute NRBCs 0.0     CRP INFLAMMATION - Normal    CRP Inflammation <3.00         Imaging   US Upper Extremity Venous Duplex Right   Final Result   IMPRESSION: No evidence for DVT. Multiple nodules in the thyroid.   Further evaluation with a dedicated thyroid ultrasound could be   performed.      ONOFRE DEL VALLE MD            SYSTEM ID:  [id number]      XR Hand Right G/E 3 Views   Final Result   IMPRESSION: No acute fracture or joint malalignment. Scattered mild   degenerative arthritis  throughout the right hand, greatest at the   first CMC and STT joints.      ZEE ORELLANA MD            SYSTEM ID:  [id number]          Independent Interpretation   X-ray right hand shows normal joint spacing and alignment. No fractures.    ED Course      Medications Administered   Medications   oxyCODONE (ROXICODONE) tablet 5 mg (5 mg Oral $Given 8/29/24 0921)       Procedures   Procedures     Discussion of Management   None    ED Course   ED Course as of 08/29/24 1512   Thu Aug 29, 2024   0904 I obtained history and examined the patient as noted above.    1329 I rechecked the patient and explained findings. We discussed plans for discharge and the patient is comfortable with this plan.    1511 I called patient's daughter to clarify findings and importance of follow-up.       Additional Documentation  None    Medical Decision Making / Diagnosis     CMS Diagnoses: None    MIPS       None    MDM   Elsa Zapien is a 73 year old female no obvious injury.  Who presents for ration of subacute right hand pain and swelling.  Differential includes possible rheumatologic disorder, labs are fairly within normal limits.  X-ray shows no abnormality and ultrasound shows no evidence of DVT.  I discussed with her and her daughter the abnormal ultrasound findings regarding the thyroid; in fact, the patient had an ultrasound pending yesterday of the thyroid but did not attend because of her hand pain issues.  They will reschedule this.  The patient previously underwent a course of methylprednisolone, but this caused hyperglycemia.  Will hold off his steroids at this point and referred to the rheumatology for further evaluation and care.  Plan primary care follow-up for recheck in 3 to 5 days return precautions for worse pain, swelling, or any other concerns.      Disposition   The patient was discharged.     Diagnosis     ICD-10-CM    1. Localized swelling on right hand  R22.31 Adult Rheumatology  Referral       2. Abnormal ultrasound  R93.89     recommend thyroid ultrasound             Scribe Disclosure:  I, Idalia Cruz, am serving as a scribe at 9:20 AM on 8/29/2024 to document services personally performed by Dagoberto Estrella MD based on my observations and the provider's statements to me.        Dagoberto Estrella MD  08/29/24 0611

## 2024-08-29 NOTE — DISCHARGE INSTRUCTIONS
It was a pleasure taking care of you today. I hope you feel much better soon.  Take ibuprofen as indicated on the bottle for pain.  Please follow-up with your primary care doctor in 3-5 days. I have also placed a rheumatology referral for further evaluation.  Return immediately for worse pain, fever, or any other concerns.

## 2024-08-29 NOTE — ED TRIAGE NOTES
Pt arrives in wheelchair with daughter for right hand pain for three weeks but is worsening the last couple of days. Also noted new swelling to hand and inability to close it completely.

## 2024-09-17 ENCOUNTER — DOCUMENTATION ONLY (OUTPATIENT)
Dept: EMERGENCY MEDICINE | Facility: CLINIC | Age: 74
End: 2024-09-17
Payer: COMMERCIAL

## 2024-09-17 DIAGNOSIS — R22.31 LOCALIZED SWELLING, MASS, OR LUMP OF UPPER EXTREMITY, RIGHT: Primary | ICD-10-CM

## 2024-10-12 ENCOUNTER — HOSPITAL ENCOUNTER (EMERGENCY)
Facility: CLINIC | Age: 74
Discharge: HOME | End: 2024-10-12
Attending: PHYSICIAN ASSISTANT | Admitting: PHYSICIAN ASSISTANT
Payer: COMMERCIAL

## 2024-10-12 ENCOUNTER — APPOINTMENT (OUTPATIENT)
Dept: ULTRASOUND IMAGING | Facility: CLINIC | Age: 74
End: 2024-10-12
Attending: PHYSICIAN ASSISTANT
Payer: COMMERCIAL

## 2024-10-12 ENCOUNTER — APPOINTMENT (OUTPATIENT)
Dept: MRI IMAGING | Facility: CLINIC | Age: 74
End: 2024-10-12
Attending: PHYSICIAN ASSISTANT
Payer: COMMERCIAL

## 2024-10-12 VITALS
TEMPERATURE: 97.6 F | HEIGHT: 62 IN | RESPIRATION RATE: 18 BRPM | DIASTOLIC BLOOD PRESSURE: 78 MMHG | BODY MASS INDEX: 31.47 KG/M2 | OXYGEN SATURATION: 99 % | SYSTOLIC BLOOD PRESSURE: 166 MMHG | WEIGHT: 171 LBS | HEART RATE: 71 BPM

## 2024-10-12 DIAGNOSIS — M79.89 SWELLING OF RIGHT HAND: ICD-10-CM

## 2024-10-12 DIAGNOSIS — M50.90 CERVICAL DISC DISEASE: ICD-10-CM

## 2024-10-12 DIAGNOSIS — M54.2 NECK PAIN: ICD-10-CM

## 2024-10-12 PROCEDURE — 96374 THER/PROPH/DIAG INJ IV PUSH: CPT

## 2024-10-12 PROCEDURE — 250N000011 HC RX IP 250 OP 636: Performed by: PHYSICIAN ASSISTANT

## 2024-10-12 PROCEDURE — 72141 MRI NECK SPINE W/O DYE: CPT

## 2024-10-12 PROCEDURE — 250N000013 HC RX MED GY IP 250 OP 250 PS 637: Performed by: PHYSICIAN ASSISTANT

## 2024-10-12 PROCEDURE — 99285 EMERGENCY DEPT VISIT HI MDM: CPT | Mod: 25

## 2024-10-12 PROCEDURE — 93971 EXTREMITY STUDY: CPT | Mod: RT

## 2024-10-12 RX ORDER — OXYCODONE HYDROCHLORIDE 5 MG/1
5 TABLET ORAL EVERY 6 HOURS PRN
Qty: 10 TABLET | Refills: 0 | Status: SHIPPED | OUTPATIENT
Start: 2024-10-12

## 2024-10-12 RX ORDER — ACETAMINOPHEN 500 MG
1000 TABLET ORAL ONCE
Status: COMPLETED | OUTPATIENT
Start: 2024-10-12 | End: 2024-10-12

## 2024-10-12 RX ORDER — KETOROLAC TROMETHAMINE 15 MG/ML
15 INJECTION, SOLUTION INTRAMUSCULAR; INTRAVENOUS ONCE
Status: COMPLETED | OUTPATIENT
Start: 2024-10-12 | End: 2024-10-12

## 2024-10-12 RX ORDER — PREDNISONE 20 MG/1
TABLET ORAL
Qty: 10 TABLET | Refills: 0 | Status: SHIPPED | OUTPATIENT
Start: 2024-10-12

## 2024-10-12 RX ORDER — CYCLOBENZAPRINE HCL 10 MG
10 TABLET ORAL 3 TIMES DAILY PRN
Qty: 20 TABLET | Refills: 0 | Status: SHIPPED | OUTPATIENT
Start: 2024-10-12

## 2024-10-12 RX ORDER — OXYCODONE HYDROCHLORIDE 5 MG/1
5 TABLET ORAL ONCE
Status: COMPLETED | OUTPATIENT
Start: 2024-10-12 | End: 2024-10-12

## 2024-10-12 RX ADMIN — KETOROLAC TROMETHAMINE 15 MG: 15 INJECTION, SOLUTION INTRAMUSCULAR; INTRAVENOUS at 10:47

## 2024-10-12 RX ADMIN — OXYCODONE HYDROCHLORIDE 5 MG: 5 TABLET ORAL at 10:14

## 2024-10-12 RX ADMIN — ACETAMINOPHEN 1000 MG: 500 TABLET, FILM COATED ORAL at 10:14

## 2024-10-12 ASSESSMENT — ACTIVITIES OF DAILY LIVING (ADL)
ADLS_ACUITY_SCORE: 35

## 2024-10-12 ASSESSMENT — COLUMBIA-SUICIDE SEVERITY RATING SCALE - C-SSRS
6. HAVE YOU EVER DONE ANYTHING, STARTED TO DO ANYTHING, OR PREPARED TO DO ANYTHING TO END YOUR LIFE?: NO
2. HAVE YOU ACTUALLY HAD ANY THOUGHTS OF KILLING YOURSELF IN THE PAST MONTH?: NO
1. IN THE PAST MONTH, HAVE YOU WISHED YOU WERE DEAD OR WISHED YOU COULD GO TO SLEEP AND NOT WAKE UP?: NO

## 2024-10-12 NOTE — ED TRIAGE NOTES
"Pt comes in with neck and right arm pain since July.  Pt states that the pain is 29/10.  Per daughter she has been seen multiple times for this issue and they have not found a cause.  She states she \"just needs a shot\"     Triage Assessment (Adult)       Row Name 10/12/24 0937          Triage Assessment    Airway WDL WDL        Respiratory WDL    Respiratory WDL WDL        Cardiac WDL    Cardiac WDL WDL                     "

## 2024-10-12 NOTE — ED NOTES
Pt presents with daughter at bedside for neck and right hand pain. Denies recent trauma. Pt reports she's been in pain for the past week and today she's unable to turn her neck from side to side without pain. Pain is intermittent and is shooting.

## 2024-10-12 NOTE — DISCHARGE INSTRUCTIONS
Discharge Instructions  Neck Strain    You have been seen today for a neck sprain or strain.  Neck strains usually result from an injury to the neck. Car accidents, contact sports, and falls are common causes of neck strain. Sometimes your neck can start to hurt because of increased activity, muscle tension, an abnormal sleeping position, or because of other problems like arthritis in the neck.     Neck pain usually comes from injured muscles and ligaments. Sometimes there is a herniated ( slipped ) disc. We do not usually do MRI scans to look for these right away, since most herniated discs will get better on their own with time. Today, we did not find any evidence that your neck pain was caused by a serious or dangerous condition. However, sometimes symptoms develop over time and cannot be found during an emergency visit, so it is very important that you follow up with your primary provider.    Generally, every Emergency Department visit should have a follow-up clinic visit with either a primary or a specialty clinic/provider. Please follow-up as instructed by your emergency provider today.    Return to the Emergency Department if:  You have increasing pain in your neck.  You develop difficulty swallowing or breathing.  You have numbness, weakness, or trouble moving your arms or legs.  You have severe dizziness and difficulty walking.  You are unable to control your bladder or bowels.  You develop severe headache or ringing in the ears.    What can I do to help myself at home?  If you had an injury, use cold for the first 1-2 days. Cold helps relieve pain and reduce inflammation.  Apply ice packs to the neck or areas of pain every 1-2 hours for 20 minutes at a time. Place a towel or cloth between your skin and the ice pack.  After the first 2 days, using heat can help with neck pain and stiffness. You may use a warm shower or bath, warm towels on the neck, or a heating pad. Do not sleep with a heating pad, as you  can be burned.   Pain medications - You may take a pain medication such as Tylenol  (acetaminophen), Advil  and Motrin  (ibuprofen), or Aleve  (naproxen).  It is usually best to rest the neck for 1-2 days after an injury, then start gentle stretching exercises.   It is helpful to place a small pillow under the nape of your neck to provide proper neutral positioning.   You should stay active and do your usual work as much as you can, unless this involves heavy physical labor. Ask your provider if you need work restrictions.  If you were given a prescription for medicine here today, be sure to read all of the information (including the package insert) that comes with your prescription.  This will include important information about the medicine, its side effects, and any warnings that you need to know about.  The pharmacist who fills the prescription can provide more information and answer questions you may have about the medicine.  If you have questions or concerns that the pharmacist cannot address, please call or return to the Emergency Department.   Remember that you can always come back to the Emergency Department if you are not able to see your regular provider in the amount of time listed above, if you get any new symptoms, or if there is anything that worries you.  Opioid Medication Information    You have been given a prescription for an opioid (narcotic) pain medicine and/or have received a pain medicine while here in the Emergency Department. These medicines can make you drowsy or impaired. You must not drive, operate dangerous equipment, or engage in any other dangerous activities while taking these medications. If you drive while taking these medications, you could be arrested for driving under the influence (DUI). Do not drink any alcohol while you are taking these medications.     Opioid pain medications can cause addiction. If you have a history of chemical dependency of any type, you are at a higher  risk of becoming addicted to pain medications.  Only take these prescribed medications to treat your pain when all other options have been tried. Take it for as short a time and as few doses as possible. Store your pain pills in a secure place, as they are frequently stolen and provide a dangerous opportunity for children or visitors in your house to start abusing these powerful medications. We will not replace any lost or stolen medicine.    If you do not finish your medication, it is a good idea to get rid of it but please do not flush it down the toilet. Please dispose of the remaining medication at a local pharmacy or law enforcement facility. The [place] [employer] has additional information on medication disposal: https://www.pca.[facility].[place]/living-green/managing-unwanted-medications.      Many prescription pain medications contain Tylenol  (acetaminophen), including Vicodin , Tylenol #3 , Norco , Lortab , and Percocet .  You should not take any extra pills of Tylenol  if you are using these prescription medications or you can get very sick.  Do not ever take more than 3000 mg of acetaminophen in any 24 hour period.    All opioids tend to cause constipation. Drink plenty of water and eat foods that have a lot of fiber, such as fruits, vegetables, prune juice, apple juice and high fiber cereal.  Take a laxative if you don t move your bowels at least every other day. Miralax , Milk of Magnesia, Colace , or Senna  can be used to keep you regular.

## 2024-10-12 NOTE — ED PROVIDER NOTES
Emergency Department Note      History of Present Illness     Chief Complaint   Neck Pain and Hand Pain    HPI   Elsa Zapien is a 73 year old female with a history of type II diabetes, alzheimer's, frequent falls, chronic bilateral knee pain, and hypertension presenting with neck pain and right hand pain. The patient has had right hand pain since June, which has gotten more painful and swollen. She is unable to close her hand. She tested positive for rheumatoid factor and was told to come back to the ED if her pain got worse. She was given a small medrol dose pack, which seemed to help,100 mg gabapentin x3 a day, percocet, 400mg ibuprofen, and Tylenol. The patient is now out of the medrol and percocet. Her daughter is hoping to increase the gabapentin dosage and try the medrol again. The patient also endorses worsening neck pain starting a week ago hurts whenever she tries to turn it. She had right-sided pain that she says was diagnosed as arthritis and pinched nerves. The pain has since switched sides and is completley different. She is unable to turn her head to the left, is up all night, and is unable to get up to go to the bathroom. The neck pain and hand pain are separate and have been present at different times. The patient denies any new falls or injuries. Fever, numbness in the legs, and irregular bowel or urinary symptoms. The patient has not taken any medications yet today.  No chest pain, sob, or vomiting.  No headache or vision changes.    Independent Historian   The patient's daughter provided most of the history and corroborates the above.     Review of External Notes   Reviewed Leon KAY 10/8/24 [facility] [place] preop Right hand pain: Has been going on for several months.  Had an x-ray of the right hand completed back in August with no acute fracture or joint malalignment.  There was mild degenerative arthritis throughout the hand.  Had an ultrasound of the upper extremity without evidence for  "DVT.  Continues to deal with pain.  Did have mildly elevated ESR and was referred to rheumatology but does not have a rheumatology appointment for several months.  Unclear etiology at this time but will update x-ray and will obtain an rheumatoid factor.     Past Medical History     Medical History and Problem List   Alzheimer disease   Abscess of abdominal wall   Apthous ulcer of tongue   Frequent falls   Diabetes, type II  Hypertension  Seizures   Nonintractable headache   Mild TBI   Bipolar 2 disorder   Primary open angle glaucoma of both eyes  Compression fracture of L2 lummbar vertebra   Chronic bilateral knee pain   Thyroid nodule   Polysubstance dependence vs abuse (cocaine, marijuana, etoh, heroin)  Primary insomnia     Medications   Acetazolamide   Atorvastatin   Docusate sodium   Lisinopril   Meloxicam   Naproxen   Ondansetron   Phenytoin   Ranitidine   Trueplus lancets   Zonisamide   Ibuprofen   Sitagliptin     Surgical History   ORIF elbow   GI ERCP with sphincterotomy x2  Cholecystectomy   Liver biopsy   Right eye   Cataract extraction, left   Ahmed valve sertion, left     Physical Exam     Patient Vitals for the past 24 hrs:   BP Temp Temp src Pulse Resp SpO2 Height Weight   10/12/24 1320 (!) 166/78 -- -- -- -- -- -- --   10/12/24 0938 (!) 182/99 97.6  F (36.4  C) Temporal 71 18 99 % 1.575 m (5' 2\") 77.6 kg (171 lb)     Physical Exam  General: Awake, alert, non-toxic.  Head:  Scalp is NC/AT  Eyes:  Conjunctiva normal, PERRL  ENT:  The external nose and ears are normal.     Oropharynx clear, uvula midline.  Neck:  Pain w/lateral ROM of neck but able to flex and extend well.  No masses.    CV:  Regular rate and rhythm    No pathologic murmur, rubs, or gallops.  Resp:  Breath sounds are clear bilaterally    Non-labored, no retractions or accessory muscle use  Abdomen: Abdomen is soft, no distension, no tenderness, no masses. No CVA tenderness.  MS:  No lower extremity edema/swelling. No midline cervical, " thoracic, or lumbar tenderness. Mild uniform swelling to R hand w/out redness or fluctuance.  Able to range all joints.    Skin:  Warm and dry, No rash or lesions noted.  Neuro:  Alert and oriented.  GCS 15 Moves all extremities normal.  No facial asymmetry. Gait normal.  Psych:  Awake. Alert. Normal affect. Appropriate interactions.     Diagnostics     Lab Results   Labs Ordered and Resulted from Time of ED Arrival to Time of ED Departure - No data to display    Imaging   US Upper Extremity Venous Duplex Right   Final Result   IMPRESSION:   1.  Negative right upper extremity venous Doppler.      Cervical spine MRI w/o contrast   Final Result   IMPRESSION:   1.  Moderate cervical spondylosis with straightening of the normal cervical lordosis.      2.  Relatively slender cervical canal on a congenital/developmental basis and superimposed shallow multilevel disc herniations contribute to mild canal stenosis at the C3-C4, C4-C5, C6-C7 and to a lesser extent C7-T1 levels with more moderate canal    stenosis at C5-C6.      3.  Multilevel foraminal stenosis as described.                Independent Interpretation   None    ED Course      Medications Administered   Medications   acetaminophen (TYLENOL) tablet 1,000 mg (1,000 mg Oral $Given 10/12/24 1014)   oxyCODONE (ROXICODONE) tablet 5 mg (5 mg Oral $Given 10/12/24 1014)   ketorolac (TORADOL) injection 15 mg (15 mg Intravenous $Given 10/12/24 1047)       Procedures   Procedures     Discussion of Management   None    ED Course   ED Course as of 10/12/24 1727   Sat Oct 12, 2024   0950 I obtained the history and examined the patient as above.    1005 I rechecked and updated the patient.     1016 I rechecked and updated the patient.         Additional Documentation  None    Medical Decision Making / Diagnosis     CMS Diagnoses: None    MIPS       None    [facility]   Elsa Zapien is a 73 year old female with a history of chronic neck pain and right hand swelling who presents  for evaluation of worsening neck pain and concern of the right hand swelling.  I do not see any recent record of cervical MRI and so we did elect to obtain images of this which showed degenerative changes and some narrowing but neuro intact nothing to suggest acute cord compression fracture or infectious process or surgical emergency.  No fevers etc.  Clearly reproducible with lateral range of motion I do not suspect meningitis subarachnoid hemorrhage or deep space infection such as RPA PTA etc.  Seems like there is probably some radicular component as it is shooting down both of the extremities.  Will do some prednisone.  The daughter is with her and understands this will likely raise blood sugar and the need to monitor this closely.  Separately from this there are some issues with chronic swelling of the right hand sounds like positive rheumatoid factor and there is been some follow-up scheduled with rheumatology as an outpatient but it is not until February.  I think the prednisone will likely help this as well.  I have very low suspicion given the chronicity for any kind of a septic joint osteomyelitis or skin or soft tissue infectious process.  Given the swelling I did obtain a right upper quadrant venous ultrasound which shows no evidence of acute DVT.  CMS is intact distally there is no evidence of arterial ischemia or compartment syndrome.  I did agree to provide a very short course of muscle relaxers as well as pain medications with instructions not to take together to avoid oversedation.  Close outpatient follow-up with PCP and return precautions for new worsening or changing symptoms.    Disposition   The patient was discharged.     Diagnosis     ICD-10-CM    1. Neck pain  M54.2       2. Swelling of right hand  M79.89     chronic      3. Cervical disc disease  M50.90            Discharge Medications   Discharge Medication List as of 10/12/2024  1:20 PM        START taking these medications    Details    cyclobenzaprine (FLEXERIL) 10 MG tablet Take 1 tablet (10 mg) by mouth 3 times daily as needed for other (Pain)., Disp-20 tablet, R-0, E-Prescribe      oxyCODONE (ROXICODONE) 5 MG tablet Take 1 tablet (5 mg) by mouth every 6 hours as needed for moderate to severe pain. Do not take together at same time as Flexeril (cyclobenzaprine), Disp-10 tablet, R-0, E-Prescribe      predniSONE (DELTASONE) 20 MG tablet Take two tablets (= 40mg) each day for 5 (five) days, Disp-10 tablet, R-0, E-Prescribe               Scribe Disclosure:  JASWINDER, Phoenix Peterson, am serving as a scribe at 10:22 AM on 10/12/2024 to document services personally performed by Aki Yadav PA-C based on my observations and the provider's statements to me.        Aki Yadav PA-C  10/12/24 [id number]

## 2024-10-19 ENCOUNTER — HEALTH MAINTENANCE LETTER (OUTPATIENT)
Age: 74
End: 2024-10-19

## 2025-03-18 ENCOUNTER — APPOINTMENT (OUTPATIENT)
Dept: CT IMAGING | Facility: CLINIC | Age: 75
DRG: 101 | End: 2025-03-18
Attending: EMERGENCY MEDICINE
Payer: COMMERCIAL

## 2025-03-18 ENCOUNTER — HOSPITAL ENCOUNTER (INPATIENT)
Facility: CLINIC | Age: 75
LOS: 1 days | Discharge: LEFT BEFORE DISCHARGE | DRG: 101 | End: 2025-03-18
Attending: EMERGENCY MEDICINE | Admitting: STUDENT IN AN ORGANIZED HEALTH CARE EDUCATION/TRAINING PROGRAM
Payer: COMMERCIAL

## 2025-03-18 ENCOUNTER — APPOINTMENT (OUTPATIENT)
Dept: GENERAL RADIOLOGY | Facility: CLINIC | Age: 75
DRG: 101 | End: 2025-03-18
Attending: EMERGENCY MEDICINE
Payer: COMMERCIAL

## 2025-03-18 VITALS
TEMPERATURE: 97.4 F | OXYGEN SATURATION: 100 % | HEART RATE: 71 BPM | WEIGHT: 190.7 LBS | SYSTOLIC BLOOD PRESSURE: 154 MMHG | RESPIRATION RATE: 18 BRPM | HEIGHT: 62 IN | BODY MASS INDEX: 35.09 KG/M2 | DIASTOLIC BLOOD PRESSURE: 74 MMHG
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
## 2025-03-18 DIAGNOSIS — S05.12XA ORBITAL CONTUSION, LEFT, INITIAL ENCOUNTER: ICD-10-CM

## 2025-03-18 DIAGNOSIS — R56.9 SEIZURE (H): ICD-10-CM

## 2025-03-18 LAB
ALBUMIN UR-MCNC: 70 MG/DL
ANION GAP SERPL CALCULATED.3IONS-SCNC: 7 MMOL/L (ref 7–15)
APPEARANCE UR: ABNORMAL
ATRIAL RATE - MUSE: 72 BPM
BACTERIA #/AREA URNS HPF: ABNORMAL /HPF
BASOPHILS # BLD AUTO: 0 10E3/UL (ref 0–0.2)
BASOPHILS NFR BLD AUTO: 0 %
BILIRUB UR QL STRIP: NEGATIVE
BUN SERPL-MCNC: 15.1 MG/DL (ref 8–23)
CALCIUM SERPL-MCNC: 8.6 MG/DL (ref 8.8–10.4)
CHLORIDE SERPL-SCNC: 103 MMOL/L (ref 98–107)
COLOR UR AUTO: YELLOW
CREAT SERPL-MCNC: 0.64 MG/DL (ref 0.51–0.95)
DIASTOLIC BLOOD PRESSURE - MUSE: NORMAL MMHG
EGFRCR SERPLBLD CKD-EPI 2021: >90 ML/MIN/1.73M2
EOSINOPHIL # BLD AUTO: 0 10E3/UL (ref 0–0.7)
EOSINOPHIL NFR BLD AUTO: 1 %
ERYTHROCYTE [DISTWIDTH] IN BLOOD BY AUTOMATED COUNT: 13.2 % (ref 10–15)
FLUAV RNA SPEC QL NAA+PROBE: NEGATIVE
FLUBV RNA RESP QL NAA+PROBE: NEGATIVE
GLUCOSE SERPL-MCNC: 236 MG/DL (ref 70–99)
GLUCOSE UR STRIP-MCNC: NEGATIVE MG/DL
HCO3 SERPL-SCNC: 22 MMOL/L (ref 22–29)
HCT VFR BLD AUTO: 33.4 % (ref 35–47)
HGB BLD-MCNC: 11 G/DL (ref 11.7–15.7)
HGB UR QL STRIP: ABNORMAL
HOLD SPECIMEN: NORMAL
IMM GRANULOCYTES # BLD: 0 10E3/UL
IMM GRANULOCYTES NFR BLD: 1 %
INTERPRETATION ECG - MUSE: NORMAL
KETONES UR STRIP-MCNC: NEGATIVE MG/DL
LEUKOCYTE ESTERASE UR QL STRIP: ABNORMAL
LYMPHOCYTES # BLD AUTO: 1.7 10E3/UL (ref 0.8–5.3)
LYMPHOCYTES NFR BLD AUTO: 23 %
MAGNESIUM SERPL-MCNC: 1.9 MG/DL (ref 1.7–2.3)
MCH RBC QN AUTO: 30.6 PG (ref 26.5–33)
MCHC RBC AUTO-ENTMCNC: 32.9 G/DL (ref 31.5–36.5)
MCV RBC AUTO: 93 FL (ref 78–100)
MONOCYTES # BLD AUTO: 0.4 10E3/UL (ref 0–1.3)
MONOCYTES NFR BLD AUTO: 5 %
MUCOUS THREADS #/AREA URNS LPF: PRESENT /LPF
NEUTROPHILS # BLD AUTO: 5.6 10E3/UL (ref 1.6–8.3)
NEUTROPHILS NFR BLD AUTO: 72 %
NITRATE UR QL: NEGATIVE
NRBC # BLD AUTO: 0 10E3/UL
NRBC BLD AUTO-RTO: 0 /100
P AXIS - MUSE: 20 DEGREES
PH UR STRIP: 6.5 [PH] (ref 5–7)
PHENYTOIN SERPL-MCNC: 12 UG/ML
PLATELET # BLD AUTO: 229 10E3/UL (ref 150–450)
POTASSIUM SERPL-SCNC: 4.1 MMOL/L (ref 3.4–5.3)
PR INTERVAL - MUSE: 172 MS
QRS DURATION - MUSE: 96 MS
QT - MUSE: 402 MS
QTC - MUSE: 440 MS
R AXIS - MUSE: -31 DEGREES
RBC # BLD AUTO: 3.59 10E6/UL (ref 3.8–5.2)
RBC URINE: 11 /HPF
RSV RNA SPEC NAA+PROBE: NEGATIVE
SARS-COV-2 RNA RESP QL NAA+PROBE: NEGATIVE
SODIUM SERPL-SCNC: 132 MMOL/L (ref 135–145)
SP GR UR STRIP: 1.02 (ref 1–1.03)
SQUAMOUS EPITHELIAL: 5 /HPF
SYSTOLIC BLOOD PRESSURE - MUSE: NORMAL MMHG
T AXIS - MUSE: 44 DEGREES
TROPONIN T SERPL HS-MCNC: 16 NG/L
TROPONIN T SERPL HS-MCNC: 18 NG/L
UROBILINOGEN UR STRIP-MCNC: 2 MG/DL
VENTRICULAR RATE- MUSE: 72 BPM
WBC # BLD AUTO: 7.7 10E3/UL (ref 4–11)
WBC URINE: 15 /HPF

## 2025-03-18 PROCEDURE — 71046 X-RAY EXAM CHEST 2 VIEWS: CPT

## 2025-03-18 PROCEDURE — 70450 CT HEAD/BRAIN W/O DYE: CPT

## 2025-03-18 PROCEDURE — 96365 THER/PROPH/DIAG IV INF INIT: CPT

## 2025-03-18 PROCEDURE — 99222 1ST HOSP IP/OBS MODERATE 55: CPT | Mod: AI | Performed by: STUDENT IN AN ORGANIZED HEALTH CARE EDUCATION/TRAINING PROGRAM

## 2025-03-18 PROCEDURE — 250N000013 HC RX MED GY IP 250 OP 250 PS 637: Performed by: EMERGENCY MEDICINE

## 2025-03-18 PROCEDURE — 80203 DRUG SCREEN QUANT ZONISAMIDE: CPT | Performed by: EMERGENCY MEDICINE

## 2025-03-18 PROCEDURE — 36415 COLL VENOUS BLD VENIPUNCTURE: CPT | Performed by: INTERNAL MEDICINE

## 2025-03-18 PROCEDURE — 85014 HEMATOCRIT: CPT | Performed by: EMERGENCY MEDICINE

## 2025-03-18 PROCEDURE — 85041 AUTOMATED RBC COUNT: CPT | Performed by: EMERGENCY MEDICINE

## 2025-03-18 PROCEDURE — 120N000001 HC R&B MED SURG/OB

## 2025-03-18 PROCEDURE — 250N000011 HC RX IP 250 OP 636: Performed by: EMERGENCY MEDICINE

## 2025-03-18 PROCEDURE — 36415 COLL VENOUS BLD VENIPUNCTURE: CPT | Performed by: EMERGENCY MEDICINE

## 2025-03-18 PROCEDURE — 250N000011 HC RX IP 250 OP 636: Performed by: STUDENT IN AN ORGANIZED HEALTH CARE EDUCATION/TRAINING PROGRAM

## 2025-03-18 PROCEDURE — 250N000013 HC RX MED GY IP 250 OP 250 PS 637: Performed by: STUDENT IN AN ORGANIZED HEALTH CARE EDUCATION/TRAINING PROGRAM

## 2025-03-18 PROCEDURE — 99285 EMERGENCY DEPT VISIT HI MDM: CPT | Mod: 25

## 2025-03-18 PROCEDURE — 93005 ELECTROCARDIOGRAM TRACING: CPT

## 2025-03-18 PROCEDURE — 84484 ASSAY OF TROPONIN QUANT: CPT | Performed by: EMERGENCY MEDICINE

## 2025-03-18 PROCEDURE — 80048 BASIC METABOLIC PNL TOTAL CA: CPT | Performed by: EMERGENCY MEDICINE

## 2025-03-18 PROCEDURE — 87637 SARSCOV2&INF A&B&RSV AMP PRB: CPT | Performed by: EMERGENCY MEDICINE

## 2025-03-18 PROCEDURE — 81001 URINALYSIS AUTO W/SCOPE: CPT | Performed by: EMERGENCY MEDICINE

## 2025-03-18 PROCEDURE — 99207 PR NO CHARGE LOS: CPT | Performed by: INTERNAL MEDICINE

## 2025-03-18 PROCEDURE — 83735 ASSAY OF MAGNESIUM: CPT | Performed by: EMERGENCY MEDICINE

## 2025-03-18 PROCEDURE — 80186 ASSAY OF PHENYTOIN FREE: CPT | Performed by: INTERNAL MEDICINE

## 2025-03-18 PROCEDURE — 80185 ASSAY OF PHENYTOIN TOTAL: CPT | Performed by: EMERGENCY MEDICINE

## 2025-03-18 PROCEDURE — 70486 CT MAXILLOFACIAL W/O DYE: CPT

## 2025-03-18 PROCEDURE — 85004 AUTOMATED DIFF WBC COUNT: CPT | Performed by: EMERGENCY MEDICINE

## 2025-03-18 RX ORDER — NALOXONE HYDROCHLORIDE 0.4 MG/ML
0.4 INJECTION, SOLUTION INTRAMUSCULAR; INTRAVENOUS; SUBCUTANEOUS
Status: DISCONTINUED | OUTPATIENT
Start: 2025-03-18 | End: 2025-03-18 | Stop reason: HOSPADM

## 2025-03-18 RX ORDER — BISACODYL 10 MG
10 SUPPOSITORY, RECTAL RECTAL DAILY PRN
Status: DISCONTINUED | OUTPATIENT
Start: 2025-03-18 | End: 2025-03-18 | Stop reason: HOSPADM

## 2025-03-18 RX ORDER — AMOXICILLIN 250 MG
1 CAPSULE ORAL 2 TIMES DAILY PRN
Status: DISCONTINUED | OUTPATIENT
Start: 2025-03-18 | End: 2025-03-18 | Stop reason: HOSPADM

## 2025-03-18 RX ORDER — BRIMONIDINE TARTRATE 2 MG/ML
1 SOLUTION/ DROPS OPHTHALMIC 2 TIMES DAILY
Status: DISCONTINUED | OUTPATIENT
Start: 2025-03-18 | End: 2025-03-18 | Stop reason: HOSPADM

## 2025-03-18 RX ORDER — GABAPENTIN 300 MG/1
300 CAPSULE ORAL 3 TIMES DAILY
Status: DISCONTINUED | OUTPATIENT
Start: 2025-03-18 | End: 2025-03-18 | Stop reason: HOSPADM

## 2025-03-18 RX ORDER — AMLODIPINE BESYLATE 10 MG/1
10 TABLET ORAL DAILY
Status: DISCONTINUED | OUTPATIENT
Start: 2025-03-18 | End: 2025-03-18 | Stop reason: HOSPADM

## 2025-03-18 RX ORDER — PHENYTOIN SODIUM 100 MG/1
200 CAPSULE, EXTENDED RELEASE ORAL AT BEDTIME
COMMUNITY

## 2025-03-18 RX ORDER — DIPHENHYDRAMINE HCL 25 MG
25-50 CAPSULE ORAL
Status: DISCONTINUED | OUTPATIENT
Start: 2025-03-18 | End: 2025-03-18 | Stop reason: HOSPADM

## 2025-03-18 RX ORDER — ACETAMINOPHEN 500 MG
1000 TABLET ORAL EVERY 6 HOURS PRN
COMMUNITY

## 2025-03-18 RX ORDER — ACETAMINOPHEN 325 MG/1
975 TABLET ORAL ONCE
Status: COMPLETED | OUTPATIENT
Start: 2025-03-18 | End: 2025-03-18

## 2025-03-18 RX ORDER — CALCIUM CARBONATE 500 MG/1
1000 TABLET, CHEWABLE ORAL 4 TIMES DAILY PRN
Status: DISCONTINUED | OUTPATIENT
Start: 2025-03-18 | End: 2025-03-18 | Stop reason: HOSPADM

## 2025-03-18 RX ORDER — AMLODIPINE BESYLATE 10 MG/1
10 TABLET ORAL DAILY
COMMUNITY
Start: 2023-08-21

## 2025-03-18 RX ORDER — PHENYTOIN SODIUM 100 MG/1
200 CAPSULE, EXTENDED RELEASE ORAL AT BEDTIME
Status: DISCONTINUED | OUTPATIENT
Start: 2025-03-18 | End: 2025-03-18 | Stop reason: HOSPADM

## 2025-03-18 RX ORDER — TIMOLOL MALEATE 5 MG/ML
1 SOLUTION/ DROPS OPHTHALMIC EVERY MORNING
COMMUNITY
Start: 2024-08-21

## 2025-03-18 RX ORDER — OXYCODONE HYDROCHLORIDE 5 MG/1
10 TABLET ORAL ONCE
Status: COMPLETED | OUTPATIENT
Start: 2025-03-18 | End: 2025-03-18

## 2025-03-18 RX ORDER — ZONISAMIDE 100 MG/1
600 CAPSULE ORAL AT BEDTIME
Status: DISCONTINUED | OUTPATIENT
Start: 2025-03-18 | End: 2025-03-18 | Stop reason: HOSPADM

## 2025-03-18 RX ORDER — NALOXONE HYDROCHLORIDE 0.4 MG/ML
0.2 INJECTION, SOLUTION INTRAMUSCULAR; INTRAVENOUS; SUBCUTANEOUS
Status: DISCONTINUED | OUTPATIENT
Start: 2025-03-18 | End: 2025-03-18 | Stop reason: HOSPADM

## 2025-03-18 RX ORDER — ACETAMINOPHEN 500 MG
1000 TABLET ORAL EVERY 6 HOURS PRN
Status: DISCONTINUED | OUTPATIENT
Start: 2025-03-18 | End: 2025-03-18 | Stop reason: HOSPADM

## 2025-03-18 RX ORDER — BISACODYL 10 MG
10 SUPPOSITORY, RECTAL RECTAL DAILY PRN
COMMUNITY

## 2025-03-18 RX ORDER — PHENYTOIN SODIUM 100 MG/1
100 CAPSULE, EXTENDED RELEASE ORAL EVERY MORNING
Status: DISCONTINUED | OUTPATIENT
Start: 2025-03-19 | End: 2025-03-18 | Stop reason: HOSPADM

## 2025-03-18 RX ORDER — GABAPENTIN 300 MG/1
1 CAPSULE ORAL 3 TIMES DAILY
COMMUNITY
Start: 2025-02-15

## 2025-03-18 RX ORDER — TIMOLOL MALEATE 5 MG/ML
1 SOLUTION/ DROPS OPHTHALMIC EVERY MORNING
Status: DISCONTINUED | OUTPATIENT
Start: 2025-03-19 | End: 2025-03-18 | Stop reason: HOSPADM

## 2025-03-18 RX ORDER — OXYCODONE HYDROCHLORIDE 5 MG/1
5 TABLET ORAL EVERY 4 HOURS PRN
Status: DISCONTINUED | OUTPATIENT
Start: 2025-03-18 | End: 2025-03-18 | Stop reason: HOSPADM

## 2025-03-18 RX ORDER — LEVETIRACETAM 10 MG/ML
1000 INJECTION INTRAVASCULAR ONCE
Status: COMPLETED | OUTPATIENT
Start: 2025-03-18 | End: 2025-03-18

## 2025-03-18 RX ORDER — CARBOXYMETHYLCELLULOSE SODIUM 5 MG/ML
1-2 SOLUTION/ DROPS OPHTHALMIC 4 TIMES DAILY PRN
COMMUNITY
Start: 2024-08-14

## 2025-03-18 RX ORDER — DOCUSATE SODIUM 100 MG/1
100 CAPSULE, LIQUID FILLED ORAL 2 TIMES DAILY
Status: DISCONTINUED | OUTPATIENT
Start: 2025-03-18 | End: 2025-03-18 | Stop reason: HOSPADM

## 2025-03-18 RX ORDER — OXYCODONE HYDROCHLORIDE 10 MG/1
10 TABLET ORAL EVERY 4 HOURS PRN
Status: DISCONTINUED | OUTPATIENT
Start: 2025-03-18 | End: 2025-03-18 | Stop reason: HOSPADM

## 2025-03-18 RX ORDER — CARBOXYMETHYLCELLULOSE SODIUM 5 MG/ML
1-2 SOLUTION/ DROPS OPHTHALMIC 4 TIMES DAILY PRN
Status: DISCONTINUED | OUTPATIENT
Start: 2025-03-18 | End: 2025-03-18 | Stop reason: HOSPADM

## 2025-03-18 RX ORDER — LATANOPROST 50 UG/ML
1 SOLUTION/ DROPS OPHTHALMIC AT BEDTIME
Status: DISCONTINUED | OUTPATIENT
Start: 2025-03-18 | End: 2025-03-18 | Stop reason: HOSPADM

## 2025-03-18 RX ORDER — AMOXICILLIN 250 MG
2 CAPSULE ORAL 2 TIMES DAILY PRN
Status: DISCONTINUED | OUTPATIENT
Start: 2025-03-18 | End: 2025-03-18 | Stop reason: HOSPADM

## 2025-03-18 RX ORDER — ATORVASTATIN CALCIUM 10 MG/1
10 TABLET, FILM COATED ORAL DAILY
COMMUNITY
Start: 2024-05-14

## 2025-03-18 RX ORDER — LEVETIRACETAM 5 MG/ML
500 INJECTION INTRAVASCULAR EVERY 12 HOURS
Status: DISCONTINUED | OUTPATIENT
Start: 2025-03-18 | End: 2025-03-18 | Stop reason: HOSPADM

## 2025-03-18 RX ORDER — LISINOPRIL 40 MG/1
40 TABLET ORAL AT BEDTIME
Status: DISCONTINUED | OUTPATIENT
Start: 2025-03-18 | End: 2025-03-18 | Stop reason: HOSPADM

## 2025-03-18 RX ORDER — OXYCODONE HYDROCHLORIDE 5 MG/1
5 TABLET ORAL EVERY 6 HOURS PRN
Status: DISCONTINUED | OUTPATIENT
Start: 2025-03-18 | End: 2025-03-18

## 2025-03-18 RX ORDER — ACETAMINOPHEN 325 MG/1
650 TABLET ORAL 3 TIMES DAILY
Status: DISCONTINUED | OUTPATIENT
Start: 2025-03-18 | End: 2025-03-18

## 2025-03-18 RX ORDER — FAMOTIDINE 20 MG/1
20 TABLET, FILM COATED ORAL 2 TIMES DAILY
Status: DISCONTINUED | OUTPATIENT
Start: 2025-03-18 | End: 2025-03-18 | Stop reason: HOSPADM

## 2025-03-18 RX ORDER — FAMOTIDINE 20 MG/1
20 TABLET, FILM COATED ORAL 2 TIMES DAILY
COMMUNITY
Start: 2025-01-08

## 2025-03-18 RX ORDER — LIDOCAINE 40 MG/G
CREAM TOPICAL
Status: DISCONTINUED | OUTPATIENT
Start: 2025-03-18 | End: 2025-03-18 | Stop reason: HOSPADM

## 2025-03-18 RX ORDER — PHENYTOIN SODIUM 100 MG/1
100 CAPSULE, EXTENDED RELEASE ORAL EVERY MORNING
COMMUNITY

## 2025-03-18 RX ORDER — IBUPROFEN 400 MG/1
400 TABLET, FILM COATED ORAL 2 TIMES DAILY
COMMUNITY
Start: 2024-05-30

## 2025-03-18 RX ORDER — IBUPROFEN 400 MG/1
400 TABLET, FILM COATED ORAL 2 TIMES DAILY
Status: DISCONTINUED | OUTPATIENT
Start: 2025-03-18 | End: 2025-03-18 | Stop reason: HOSPADM

## 2025-03-18 RX ADMIN — ACETAMINOPHEN 650 MG: 325 TABLET, FILM COATED ORAL at 13:28

## 2025-03-18 RX ADMIN — OXYCODONE HYDROCHLORIDE 10 MG: 5 TABLET ORAL at 04:05

## 2025-03-18 RX ADMIN — ACETAMINOPHEN 975 MG: 325 TABLET, FILM COATED ORAL at 01:59

## 2025-03-18 RX ADMIN — ACETAMINOPHEN 650 MG: 325 TABLET, FILM COATED ORAL at 08:36

## 2025-03-18 RX ADMIN — OXYCODONE HYDROCHLORIDE 5 MG: 5 TABLET ORAL at 08:36

## 2025-03-18 RX ADMIN — LEVETIRACETAM 500 MG: 5 INJECTION INTRAVENOUS at 06:43

## 2025-03-18 RX ADMIN — LEVETIRACETAM 1000 MG: 10 INJECTION INTRAVENOUS at 05:29

## 2025-03-18 ASSESSMENT — ACTIVITIES OF DAILY LIVING (ADL)
ADLS_ACUITY_SCORE: 41
ADLS_ACUITY_SCORE: 39
ADLS_ACUITY_SCORE: 41
ADLS_ACUITY_SCORE: 41
ADLS_ACUITY_SCORE: 39
ADLS_ACUITY_SCORE: 41

## 2025-03-18 ASSESSMENT — COLUMBIA-SUICIDE SEVERITY RATING SCALE - C-SSRS
1. IN THE PAST MONTH, HAVE YOU WISHED YOU WERE DEAD OR WISHED YOU COULD GO TO SLEEP AND NOT WAKE UP?: NO
6. HAVE YOU EVER DONE ANYTHING, STARTED TO DO ANYTHING, OR PREPARED TO DO ANYTHING TO END YOUR LIFE?: NO
2. HAVE YOU ACTUALLY HAD ANY THOUGHTS OF KILLING YOURSELF IN THE PAST MONTH?: NO

## 2025-03-18 NOTE — PLAN OF CARE
ROOM # 212-2    Living Situation (if not independent, order SW consult): with with daughter 2 story  Facility name:  : [facility] [phone]     Activity level at baseline: Independent with walker/scooter  Activity level on admit: Assist x1, walker, gb    Who will be transporting you at discharge: [facility]    Patient registered to observation; given Patient Bill of Rights; given the opportunity to ask questions about observation status and their plan of care.  Patient has been oriented to the observation room, bathroom and call light is in place.    Discussed discharge goals and expectations with patient/family.

## 2025-03-18 NOTE — PHARMACY-ADMISSION MEDICATION HISTORY
Pharmacist Admission Medication History    Admission medication history is complete. The information provided in this note is only as accurate as the sources available at the time of the update.    Information Source(s): Patient's daughter Uri Marksvance/Gayle via phone    Pertinent Information: None    Changes made to PTA medication list:  Added: Amlodipine, Atorvastatin, Gabapentin, Ibuprofen, Refresh eye drops, Sitagliptin, Timolol  Deleted: Dorzolamide-timolol, Erythromycin, Meloxicam, Nicotrol, Ondansetron, Oxycodone, Prednisolone ophthalmic, Ranitidine  Changed:   Brimonidine 0.1% L eye BID --> 0.2% BOTH eyes BID  Zonisamide 500mg --> 600mg HS    Allergies reviewed with patient and updates made in EHR: no    Medication History Completed By: Alexis Can RPH 3/18/2025 12:56 PM    PTA Med List   Medication Sig Note Last Dose/Taking    acetaminophen (TYLENOL) 500 MG tablet Take 1,000 mg by mouth every 6 hours as needed for pain.  3/17/2025 Noon    amLODIPine (NORVASC) 10 MG tablet Take 10 mg by mouth daily.  3/17/2025 Morning    atorvastatin (LIPITOR) 10 MG tablet Take 10 mg by mouth daily.  3/17/2025 Bedtime    bisacodyl (DULCOLAX) 10 MG suppository Place 10 mg rectally daily as needed for constipation. 3/18/2025: Typically 2-3x weekly Past Week    brimonidine (ALPHAGAN) 0.2 % ophthalmic solution Place 1 drop into both eyes 2 times daily.  3/17/2025 Bedtime    CARBOXYMETHYLCELLULOSE SODIUM 0.5 % ophthalmic solution Place 1-2 drops into both eyes 4 times daily as needed for dry eyes.  3/17/2025    docusate sodium (COLACE) 100 MG capsule Take 1 capsule by mouth 2 times daily.  3/17/2025 Bedtime    famotidine (PEPCID) 20 MG tablet Take 20 mg by mouth 2 times daily.  3/17/2025 Bedtime    gabapentin (NEURONTIN) 300 MG capsule Take 1 capsule by mouth 3 times daily.  3/17/2025 Bedtime    ibuprofen (ADVIL/MOTRIN) 400 MG tablet Take 400 mg by mouth 2 times daily.  3/17/2025 Evening    latanoprost  (XALATAN) 0.005 % ophthalmic solution Place 1 drop into both eyes At Bedtime  3/17/2025 Bedtime    lisinopril (PRINIVIL/ZESTRIL) 40 MG tablet Take 1 tablet (40 mg) by mouth At Bedtime  3/17/2025 Bedtime    Multiple Vitamin (MULTIVITAMINS PO) Take 1 tablet by mouth daily  Taking    phenytoin (DILANTIN) 100 MG ER capsule Take 100 mg by mouth every morning. Along with two 30mg capsules for total morning dose of 160mg.  3/17/2025 Morning    phenytoin (DILANTIN) 100 MG ER capsule Take 200 mg by mouth at bedtime.  3/17/2025 Bedtime    phenytoin (DILANTIN) 30 MG ER capsule Take 60 mg by mouth every morning. Along with 100mg capsule for total morning dose of 160mg.  3/17/2025 Morning    sitagliptin (JANUVIA) 100 MG tablet Take 100 mg by mouth daily.  3/17/2025 Morning    timolol maleate (TIMOPTIC) 0.5 % ophthalmic solution Place 1 drop into both eyes every morning.  3/17/2025 Morning    zonisamide (ZONEGRAN) 100 MG capsule Take 600 mg by mouth at bedtime.  3/17/2025 Bedtime

## 2025-03-18 NOTE — ED NOTES
[facility]  ED Nurse Handoff Report    ED Chief complaint: Fall and Seizures  . ED Diagnosis:   Final diagnoses:   Seizure (H)   Orbital contusion, left, initial encounter   Fall, initial encounter       Allergies:   Allergies   Allergen Reactions    Droperidol      Other reaction(s): *Unknown, Unknown    Metformin Fatigue    Pcn [Penicillins]     Tetanus Toxoid     Tetanus-Diphtheria Toxoids Td Swelling     Arm swelling    Valproic Acid Other (See Comments)     Other reaction(s): Hepatic Dysfunction  Elevated ammonia levels  Elevated ammonia levels      Lacosamide Rash    Lamotrigine Rash    Tramadol Rash       Code Status: Full Code    Activity level - Baseline/Home:  walker.  Activity Level - Current:   standby and assist of 1.   Lift room needed: No.   Bariatric: No   Needed: No   Isolation: No.   Infection: Not Applicable.     Respiratory status: Room air    Vital Signs (within 30 minutes):   Vitals:    03/18/25 0213 03/18/25 0330 03/18/25 0400 03/18/25 0430   BP: (!) 149/78 (!) 168/70 (!) 162/66 (!) 158/71   Pulse: 71 71 70 71   Resp: 13 14 16 15   Temp:       TempSrc:       SpO2: 100%          Cardiac Rhythm:  ,      Pain level:    Patient confused: Yes.   Patient Falls Risk: nonskid shoes/slippers when out of bed, arm band in place, patient and family education, assistive device/personal items within reach, and activity supervised.   Elimination Status: Has voided     Patient Report - Initial Complaint: fall, seizures.   Focused Assessment: 74 year old female with a history of Alzheimer's disease, seizures, type 2 diabetes mellitus, and hypertension presenting to the ED for evaluation following a fall resulting from a seizure. Her daughter and granddaughter arrived near the end of the patient encounter and contributed to the history. The patient reports that she went outside to her garage to smoke around 0211-0194 yesterday. This was the last thing she remembers before  waking up on the garage floor and calling her daughter using her medical alert button. Elsa reports recent trouble with her wrists and knees and therefore is unsure whether it was this or a seizure that caused her fall. However, upon consulting with her daughter, it is clear that Elsa has a history of regular seizures occurring every 2-3 weeks and that this is likely what led to her fall today. Since the event, Elsa endorses pain in her forehead, especially above her left eye. She denies any recent cough, fever, or cold symptoms. Regarding her seizure history, the patient's daughter states that she has been taking all of her seizure medications and denies any recent changes to these. She is scheduled to see a specialist today (3/18/25) to discuss her seizures. Since Elsa has seizures relatively often, her daughter notes that their primary concern for her ED visit is her head trauma sustained upon falling.      Abnormal Results:   Labs Ordered and Resulted from Time of ED Arrival to Time of ED Departure   BASIC METABOLIC PANEL - Abnormal       Result Value    Sodium 132 (*)     Potassium 4.1      Chloride 103      Carbon Dioxide (CO2) 22      Anion Gap 7      Urea Nitrogen 15.1      Creatinine 0.64      GFR Estimate >90      Calcium 8.6 (*)     Glucose 236 (*)    ROUTINE UA WITH MICROSCOPIC REFLEX TO CULTURE - Abnormal    Color Urine Yellow      Appearance Urine Slightly Cloudy (*)     Glucose Urine Negative      Bilirubin Urine Negative      Ketones Urine Negative      Specific Gravity Urine 1.023      Blood Urine Moderate (*)     pH Urine 6.5      Protein Albumin Urine 70 (*)     Urobilinogen Urine 2.0      Nitrite Urine Negative      Leukocyte Esterase Urine Small (*)     Bacteria Urine Many (*)     Mucus Urine Present (*)     RBC Urine 11 (*)     WBC Urine 15 (*)     Squamous Epithelials Urine 5 (*)    TROPONIN T, HIGH SENSITIVITY - Abnormal    Troponin T, High Sensitivity 18 (*)    CBC WITH PLATELETS AND  DIFFERENTIAL - Abnormal    WBC Count 7.7      RBC Count 3.59 (*)     Hemoglobin 11.0 (*)     Hematocrit 33.4 (*)     MCV 93      MCH 30.6      MCHC 32.9      RDW 13.2      Platelet Count 229      % Neutrophils 72      % Lymphocytes 23      % Monocytes 5      % Eosinophils 1      % Basophils 0      % Immature Granulocytes 1      NRBCs per 100 WBC 0      Absolute Neutrophils 5.6      Absolute Lymphocytes 1.7      Absolute Monocytes 0.4      Absolute Eosinophils 0.0      Absolute Basophils 0.0      Absolute Immature Granulocytes 0.0      Absolute NRBCs 0.0     TROPONIN T, HIGH SENSITIVITY - Abnormal    Troponin T, High Sensitivity 16 (*)    MAGNESIUM - Normal    Magnesium 1.9     PHENYTOIN LEVEL - Normal    Phenytoin 12.0     INFLUENZA A/B, RSV AND SARS-COV2 PCR - Normal    Influenza A PCR Negative      Influenza B PCR Negative      RSV PCR Negative      SARS CoV2 PCR Negative     ZONISAMIDE LEVEL QUANTITATIVE   URINE CULTURE        XR Chest 2 Views   Final Result   IMPRESSION: Negative chest.      CT Facial Bones without Contrast   Final Result   IMPRESSION:   HEAD CT:   1.  No CT evidence for acute intracranial process.   2.  Brain atrophy and presumed chronic microvascular ischemic changes as above.      FACIAL BONE CT:   1. No facial bone or mandibular fracture.         Head CT w/o contrast   Final Result   IMPRESSION:   HEAD CT:   1.  No CT evidence for acute intracranial process.   2.  Brain atrophy and presumed chronic microvascular ischemic changes as above.      FACIAL BONE CT:   1. No facial bone or mandibular fracture.             Treatments provided: see MAR  Family Comments: N/A  OBS brochure/video discussed/provided to patient:  N/A  ED Medications:   Medications   acetaminophen (TYLENOL) tablet 975 mg (975 mg Oral $Given 3/18/25 0151)   oxyCODONE (ROXICODONE) tablet 10 mg (10 mg Oral $Given 3/18/25 0936)   levETIRAcetam (KEPPRA) intermittent infusion 1,000 mg (1,000 mg Intravenous $New Bag 3/18/25 4303)        Drips infusing:  No  For the majority of the shift this patient was Green.   Interventions performed were N/A.    Sepsis treatment initiated: No    Cares/treatment/interventions/medications to be completed following ED care: see notes    ED Nurse Name: Veronika Landin, RN  6:01 AM

## 2025-03-18 NOTE — PROGRESS NOTES
Elsa Zapien was admitted by Dr. Trujillo this am with sz disorder who was admitted with concern for breakthrough sz. She is has her usual Neurology care through [facility] and is usually on Phenytoin with Zonisamide.     The pt reportedly was taking her medications as usual, but had what was suspected to have been recurrent sz, for which reason she was brought to the ED for evaluation. She received a loading dose of Keppra and was admitted for EEG and Neurology consultation.     Today, when I came by, she was first down having an EEG.     Later I returned and found her alert, coherent and in NAD.    Ms. Zapien was on the phone with her daughter, [facility]ina, who included the information that the patient was actually due for subspecialty epilepsy consultation today.  She obviously missed that because she was having seizures yesterday.      At this time, the patient's usual home medications are resumed.  Neurology has already seen her and their only comment at this point is that they recommend that she follow-up with her usual neurologist.  Tomorrow, I will try to connect with the patient's [facility] neurology team and make a plan so the patient can be seen in an expedited way.    CUCA

## 2025-03-18 NOTE — PLAN OF CARE
RECEIVING UNIT ED HANDOFF REVIEW    Above ED Nurse Handoff Report was reviewed: Yes  Reviewed by: Gianan Laura RN on March 18, 2025 at 7:38 AM     Report given by Kathryn Johnson RN.

## 2025-03-18 NOTE — CONSULTS
"DATE OF SERVICE : 3/18/2025    DATE OF ADMISSION: 3/18/2025    NEUROLOGICAL CONSULTATION    REQUESTED BY Marisa Haskins MD    SOURCE OF INFORMATION:Patient and EHR    REASON FOR CONSULTATION:   seizure    HISTORY OF PRESENT ILLNESS:       74 years old with a history of epilepsy on dual antiepileptic regimen zonisamide 600 mg at  and Dilantin 160/200-presenting with episode of syncope.     We were asked for evaluation for seizure management.  He does follows with neurology at [place]. With her current presentation patient was outside home she fell lost her consciousness.  Phenytoin levels were therapeutic.      Labs reviewed normal white count, mildly low sodium.  Urinalysis positive leukocyte esterase, cultures were pending.    Upon arrival she was loaded with IV Keppra admitted for further eval.  She is currently on maintenance regimen for 500 mg twice daily.        Reviewed prior records:     7/2021  Seizure type 1: She does not have an aura. She has staring spells. She states \"granddaughter has told me I was making eatting movements and staring. My daughter tells me I get them 2-3 times per month\".     Seizure type 2: possible generalized tonic-clonic convulsion, she wakes up on the floor with bruises, tongue bite, and loss of urine. There was no family present to obtain further history. She thinks she has \"larger seizure\". She was not able to tell me frequency, but, states this happens rarely.     Past Medical History:   Diagnosis Date    Alzheimer disease (H)     Diabetes (H)     Glaucoma     Hypertension     Seizures (H)        Medications Prior to Admission   Medication Sig Dispense Refill Last Dose/Taking    amLODIPine (NORVASC) 10 MG tablet Take 10 mg by mouth daily.   Taking    atorvastatin (LIPITOR) 10 MG tablet Take 10 mg by mouth daily.   Taking    brimonidine (ALPHAGAN) 0.2 % ophthalmic solution Place 1 drop into both eyes 2 times daily.   Taking    CARBOXYMETHYLCELLULOSE SODIUM 0.5 % ophthalmic " solution Place 1-2 drops into both eyes 3 times daily as needed for dry eyes.   Taking As Needed    docusate sodium (COLACE) 100 MG capsule Take 1 capsule by mouth 2 times daily as needed for constipation.   Taking As Needed    famotidine (PEPCID) 20 MG tablet Take 20 mg by mouth 2 times daily.   Taking    gabapentin (NEURONTIN) 300 MG capsule Take 1 capsule by mouth 3 times daily.   Taking    ibuprofen (ADVIL/MOTRIN) 400 MG tablet Take 400 mg by mouth 2 times daily.   Taking    latanoprost (XALATAN) 0.005 % ophthalmic solution Place 1 drop into both eyes At Bedtime   Taking    lisinopril (PRINIVIL/ZESTRIL) 40 MG tablet Take 1 tablet (40 mg) by mouth At Bedtime   Taking    Multiple Vitamin (MULTIVITAMINS PO) Take 1 tablet by mouth daily   Taking    phenytoin (DILANTIN) 100 MG ER capsule Take 100 mg by mouth every morning. Along with two 30mg capsules for total morning dose of 160mg.   Taking    phenytoin (DILANTIN) 100 MG ER capsule Take 200 mg by mouth at bedtime.   Taking    phenytoin (DILANTIN) 30 MG ER capsule Take 60 mg by mouth every morning. Along with 100mg capsule for total morning dose of 160mg.   Taking    sitagliptin (JANUVIA) 100 MG tablet Take 100 mg by mouth daily.   Taking    timolol maleate (TIMOPTIC) 0.5 % ophthalmic solution Place 1 drop into both eyes every morning.   Taking    zonisamide (ZONEGRAN) 100 MG capsule Take 600 mg by mouth at bedtime.   Taking    blood glucose (NO BRAND SPECIFIED) test strip Use to test blood sugar 1 times daily or as directed. Uses One Touch Ultra.       capsicum oleoresin (TRIXAICIN) 0.025 % external cream Apply topically 2 times daily as needed (Left knee pain)                 Allergies   Allergen Reactions    Droperidol      Other reaction(s): *Unknown, Unknown    Metformin Fatigue    Pcn [Penicillins]     Tetanus Toxoid     Tetanus-Diphtheria Toxoids Td Swelling     Arm swelling    Valproic Acid Other (See Comments)     Other reaction(s): Hepatic  "Dysfunction  Elevated ammonia levels  Elevated ammonia levels      Lacosamide Rash    Lamotrigine Rash    Tramadol Rash         SocHx:  reports that she has been smoking. She has never used smokeless tobacco. She reports that she does not currently use drugs. She reports that she does not drink alcohol.        PHYSICAL EXAM  BP (!) 154/74 (BP Location: Right arm)   Pulse 71   Temp 97.4  F (36.3  C) (Oral)   Resp 18   Ht 1.575 m (5' 2\")   Wt 86.5 kg (190 lb 11.2 oz)   LMP  (LMP Unknown)   SpO2 100%   BMI 34.88 kg/m        Alert , Able to answer phone at bedside when daughter called her. Bruising over the left periorbital regions . Able to move upper limbs . Lying in bed with both legs knees in flexed positions     Lab and X-ray:   Recent Labs   Lab Test 03/18/25 0137   WBC 7.7   HGB 11.0*      POTASSIUM 4.1     Recent Labs   Lab Test 03/18/25 0137 07/22/22  1517   POTASSIUM 4.1 4.3   CHLORIDE 103 106   BUN 15.1 18     Recent Labs   Lab Test 03/18/25 0137 08/29/24  0940   WBC 7.7 6.9   HGB 11.0* 11.3*   MCV 93 95    205     Recent Labs   Lab Test 07/06/21  1338   AST 45   ALT 48     No lab results found.    Invalid input(s): \"CHOLESTEROL\", \"TRIGLYCERIDES\"  No lab results found.    Laboratory results were personally interpreted and reviewed in detail.  Imaging studies reviewed and interpreted in detail      Summary: List Problems:   Patient Active Problem List   Diagnosis    Seizure (H)    Fall, initial encounter    Orbital contusion, left, initial encounter       ASSESSMENT:     Spells of falls/ associated head injury - s/p falls bruising left periorbital regions   Dilantin levels were therapeutic    Pending urine cultures  Zonisamide levels were pending   EEG   If above work up is neg defer to primary neurologist for further adjustments in antiepileptics     Physical and occupational therapies       Thank you for the opportunity to provide consultation on Elsa Zapien    "

## 2025-03-18 NOTE — PLAN OF CARE
"Goal Outcome Evaluation:      Plan of Care Reviewed With: patient    Overall Patient Progress: no changeOverall Patient Progress: no change    Outcome Evaluation: Pt A&Ox4, intermittent confusion, asking for daughter. Pain 9/10 in head/wrist, oxycodone/tylenol x2 given/ice applied. Denies n/v/d/numbness/tingling/sob/dizziness/chest pain. WDL breath/heart sounds. Regular diet, encourage to eat/stay hydrated. Assist x1 walk/gb. Saline locked, clean/dry/intact. Bed alarm in place. Seizures precautions in place. EEG pending. Neuro following. Discharge pending.    BP (!) 154/74 (BP Location: Right arm)   Pulse 71   Temp 97.4  F (36.3  C) (Oral)   Resp 18   Ht 1.575 m (5' 2\")   Wt 86.5 kg (190 lb 11.2 oz)   LMP  (LMP Unknown)   SpO2 100%   BMI 34.88 kg/m      Problem: Adult Inpatient Plan of Care  Goal: Plan of Care Review  Description: The Plan of Care Review/Shift note should be completed every shift.  The Outcome Evaluation is a brief statement about your assessment that the patient is improving, declining, or no change.  This information will be displayed automatically on your shift  note.  Outcome: Progressing  Flowsheets (Taken 3/18/2025 4408)  Outcome Evaluation: Pt A&Ox4, intermittent confusion, asking for daughter. Pain 9/10 in head/wrist, oxycodone/tylenol x2 given/ice applied. Denies n/v/d/numbness/tingling/sob/dizziness/chest pain. WDL breath/heart sounds. Regular diet, encourage to eat/stay hydrated. Assist x1 walk/gb. Saline locked, clean/dry/intact. Bed alarm in place. Seizures precautions in place. EEG pending. Neuro following. Discharge pending.  Plan of Care Reviewed With: patient  Overall Patient Progress: no change  Goal: Patient-Specific Goal (Individualized)  Description: You can add care plan individualizations to a care plan. Examples of Individualization might be:  \"Parent requests to be called daily at 9am for status\", \"I have a hard time hearing out of my right ear\", or \"Do not touch me " "to wake me up as it startles  me\".  Outcome: Progressing  Goal: Absence of Hospital-Acquired Illness or Injury  Outcome: Progressing  Intervention: Identify and Manage Fall Risk  Recent Flowsheet Documentation  Taken 3/18/2025 0836 by Gianna Laura, RN  Safety Promotion/Fall Prevention:   supervised activity   safety round/check completed   room near nurse's station   room door open   room organization consistent   patient and family education   nonskid shoes/slippers when out of bed   mobility aid in reach   increase visualization of patient   increased rounding and observation   clutter free environment maintained   assistive device/personal items within reach   activity supervised  Intervention: Prevent Skin Injury  Recent Flowsheet Documentation  Taken 3/18/2025 0836 by Gianna Laura, RN  Body Position: foot of bed elevated  Skin Protection:   adhesive use limited   incontinence pads utilized   tubing/devices free from skin contact  Intervention: Prevent Infection  Recent Flowsheet Documentation  Taken 3/18/2025 0836 by Gianna Laura, RN  Infection Prevention:   single patient room provided   rest/sleep promoted   hand hygiene promoted   equipment surfaces disinfected   environmental surveillance performed  Goal: Optimal Comfort and Wellbeing  Outcome: Progressing  Intervention: Monitor Pain and Promote Comfort  Recent Flowsheet Documentation  Taken 3/18/2025 0836 by Gianna Laura, RN  Pain Management Interventions: medication (see MAR)  Intervention: Provide Person-Centered Care  Recent Flowsheet Documentation  Taken 3/18/2025 0836 by Gianna Laura RN  Trust Relationship/Rapport:   care explained   choices provided   emotional support provided   questions answered   reassurance provided   thoughts/feelings acknowledged  Goal: Readiness for Transition of Care  Outcome: Progressing  Intervention: Mutually Develop Transition Plan  Recent Flowsheet Documentation  Taken 3/18/2025 0841 " by Gianna Laura, RN  Equipment Currently Used at Home: (scooter)   walker, rolling   other (see comments)     Problem: Delirium  Goal: Optimal Coping  Outcome: Progressing  Goal: Improved Behavioral Control  Outcome: Progressing  Intervention: Minimize Safety Risk  Recent Flowsheet Documentation  Taken 3/18/2025 0836 by Gianna Laura, RN  Enhanced Safety Measures:   review medications for side effects with activity   patient/family teach back on injury risk   assistive devices when indicated  Trust Relationship/Rapport:   care explained   choices provided   emotional support provided   questions answered   reassurance provided   thoughts/feelings acknowledged  Goal: Improved Attention and Thought Clarity  Outcome: Progressing  Goal: Improved Sleep  Outcome: Progressing

## 2025-03-18 NOTE — ED NOTES
Patient's daughter call informing RN that pt is having seizure. Daughter reported that they noticed pt already staring blankly and not responding when called. Unknown for how many minutes/seconds. When RN enter the room, pt with eyes close, not responding when name being called. After less than a minute, pt open her eye appears confused. Daughter states, it usually takes few more minutes for the pt to answer.  Pt connected to monitor. MD notified and seen immediately by Dr. Stratton.

## 2025-03-18 NOTE — ED TRIAGE NOTES
Arrives via EMS, fall at home, went out to smoke a cigarette and fell. Woke up and called out for daughter, found to be confused and suspected seizure. Left facial hematoma. History of epilepsy , no blood thinners      Triage Assessment (Adult)       Row Name 03/18/25 0131          Triage Assessment    Airway WDL WDL        Respiratory WDL    Respiratory WDL WDL        Skin Circulation/Temperature WDL    Skin Circulation/Temperature WDL WDL        Cardiac WDL    Cardiac WDL WDL        Peripheral/Neurovascular WDL    Peripheral Neurovascular WDL WDL        Cognitive/Neuro/Behavioral WDL    Cognitive/Neuro/Behavioral WDL WDL

## 2025-03-19 LAB — ZONISAMIDE SERPL-MCNC: 28 UG/ML

## 2025-03-20 ENCOUNTER — PATIENT OUTREACH (OUTPATIENT)
Dept: CARE COORDINATION | Facility: CLINIC | Age: 75
End: 2025-03-20
Payer: COMMERCIAL

## 2025-03-20 LAB
BACTERIA UR CULT: ABNORMAL
BACTERIA UR CULT: ABNORMAL
PHENYTOIN FREE SERPL-MCNC: 1.4 UG/ML

## 2025-03-20 NOTE — PROGRESS NOTES
Connected Care Resource Center Contact  [facility]/Voicemail     Clinical Data: Post-Discharge Outreach     Outreach attempted x 2.  Left message on patient's voicemail, providing [facility]'s central phone number of [phone] ([phone]) for questions/concerns and/or to schedule an appt with an [facility] provider, if they do not have a PCP.      Plan:  [facility] will do no further outreaches at this time.        Miya BAUMAN, Community Health Worker  Clinic Care Coordination  [facility]  Phone: [phone]      *Connected Care Resource Team does NOT follow patient ongoing. Referrals are identified based on internal discharge reports and the outreach is to ensure patient has an understanding of their discharge instructions

## 2025-03-22 LAB
BACTERIA UR CULT: ABNORMAL
BACTERIA UR CULT: ABNORMAL

## 2025-03-23 ENCOUNTER — TELEPHONE (OUTPATIENT)
Dept: FAMILY MEDICINE | Facility: CLINIC | Age: 75
End: 2025-03-23
Payer: COMMERCIAL

## 2025-03-23 DIAGNOSIS — N30.00 ACUTE CYSTITIS WITHOUT HEMATURIA: Primary | ICD-10-CM

## 2025-03-23 RX ORDER — CEFDINIR 300 MG/1
300 CAPSULE ORAL 2 TIMES DAILY
Qty: 10 CAPSULE | Refills: 0 | Status: SHIPPED | OUTPATIENT
Start: 2025-03-23 | End: 2025-03-28

## 2025-03-23 NOTE — TELEPHONE ENCOUNTER
Patient left the night of admission AGAINST MEDICAL ADVICE.    Follow-up of urine culture reveals it is positive for Klebsiella and Proteus.  This could definitely lower the seizure threshold which would explain her seizure.  I called both the patient and her daughter without answer and left voicemail to call back.  I would start a third-generation cephalosporin for treatment if they call back. Could consider Augmentin but I see Penicillin allergy    Patient's family called back and patient is having urinary symptoms.  Cefdinir 300 mg twice daily x 5 days sent to [facility].  Recommend follow-up with primary care in the next week.

## 2025-06-28 ENCOUNTER — HOSPITAL ENCOUNTER (EMERGENCY)
Facility: CLINIC | Age: 75
Discharge: HOME | End: 2025-06-28
Attending: EMERGENCY MEDICINE | Admitting: EMERGENCY MEDICINE
Payer: COMMERCIAL

## 2025-06-28 VITALS
HEIGHT: 62 IN | WEIGHT: 180 LBS | TEMPERATURE: 98.3 F | OXYGEN SATURATION: 98 % | HEART RATE: 75 BPM | RESPIRATION RATE: 20 BRPM | DIASTOLIC BLOOD PRESSURE: 74 MMHG | BODY MASS INDEX: 33.13 KG/M2 | SYSTOLIC BLOOD PRESSURE: 172 MMHG

## 2025-06-28 DIAGNOSIS — N39.0 ACUTE UTI: ICD-10-CM

## 2025-06-28 LAB
ALBUMIN UR-MCNC: 10 MG/DL
APPEARANCE UR: CLEAR
BACTERIA #/AREA URNS HPF: ABNORMAL /HPF
BILIRUB UR QL STRIP: NEGATIVE
COLOR UR AUTO: YELLOW
GLUCOSE UR STRIP-MCNC: NEGATIVE MG/DL
HGB UR QL STRIP: ABNORMAL
KETONES UR STRIP-MCNC: NEGATIVE MG/DL
LEUKOCYTE ESTERASE UR QL STRIP: ABNORMAL
MUCOUS THREADS #/AREA URNS LPF: PRESENT /LPF
NITRATE UR QL: NEGATIVE
PH UR STRIP: 5.5 [PH] (ref 5–7)
RBC URINE: 10 /HPF
SP GR UR STRIP: 1.02 (ref 1–1.03)
SQUAMOUS EPITHELIAL: 3 /HPF
UROBILINOGEN UR STRIP-MCNC: NORMAL MG/DL
WBC URINE: 35 /HPF

## 2025-06-28 PROCEDURE — 250N000013 HC RX MED GY IP 250 OP 250 PS 637: Performed by: EMERGENCY MEDICINE

## 2025-06-28 PROCEDURE — 81001 URINALYSIS AUTO W/SCOPE: CPT | Performed by: EMERGENCY MEDICINE

## 2025-06-28 PROCEDURE — 99284 EMERGENCY DEPT VISIT MOD MDM: CPT

## 2025-06-28 PROCEDURE — 87086 URINE CULTURE/COLONY COUNT: CPT | Performed by: EMERGENCY MEDICINE

## 2025-06-28 RX ORDER — OXYCODONE HYDROCHLORIDE 5 MG/1
2.5 TABLET ORAL EVERY 6 HOURS PRN
Qty: 5 TABLET | Refills: 0 | Status: SHIPPED | OUTPATIENT
Start: 2025-06-28

## 2025-06-28 RX ORDER — CEPHALEXIN 500 MG/1
500 CAPSULE ORAL ONCE
Status: COMPLETED | OUTPATIENT
Start: 2025-06-28 | End: 2025-06-28

## 2025-06-28 RX ORDER — CEPHALEXIN 500 MG/1
500 CAPSULE ORAL 2 TIMES DAILY
Qty: 10 CAPSULE | Refills: 0 | Status: SHIPPED | OUTPATIENT
Start: 2025-06-29 | End: 2025-07-04

## 2025-06-28 RX ADMIN — OXYCODONE HYDROCHLORIDE 2.5 MG: 5 TABLET ORAL at 21:58

## 2025-06-28 RX ADMIN — CEPHALEXIN 500 MG: 500 CAPSULE ORAL at 21:58

## 2025-06-28 ASSESSMENT — ACTIVITIES OF DAILY LIVING (ADL)
ADLS_ACUITY_SCORE: 44
ADLS_ACUITY_SCORE: 44

## 2025-06-28 ASSESSMENT — COLUMBIA-SUICIDE SEVERITY RATING SCALE - C-SSRS
2. HAVE YOU ACTUALLY HAD ANY THOUGHTS OF KILLING YOURSELF IN THE PAST MONTH?: NO
6. HAVE YOU EVER DONE ANYTHING, STARTED TO DO ANYTHING, OR PREPARED TO DO ANYTHING TO END YOUR LIFE?: NO
1. IN THE PAST MONTH, HAVE YOU WISHED YOU WERE DEAD OR WISHED YOU COULD GO TO SLEEP AND NOT WAKE UP?: NO

## 2025-06-29 NOTE — ED PROVIDER NOTES
Emergency Department Note      History of Present Illness     Chief Complaint   Urinary Frequency      HPI   Elsa Zapien is a 74 year old female with a history of Alzheimer's, HTN, DM2, urinary incontinence, and seizure disorder presenting to the ED for urinary frequency. She reports urinary incontinence accompanied with urinary frequency and urgency for the past few days. She states that she feels like she has to urinate while walking and on the toilet, however, is unable to do so. She has been using Depends for her incontinence. Her daughter notes that she is typically incontinent with her seizures, however, her current symptoms have been occurring constantly. Due to these symptoms, she has been decreasing her fluid intake. She was seen in the ED in April for a seizure. Denies dysuria, incontinence upon coughing, fever, chills, or abdominal pain.     Independent Historian   Daughter provides additional history regarding symptoms.     Review of External Notes   Clinic note reviewed from 6/19/25.     Past Medical History     Medical History and Problem List   Alzheimer's   DM2  Glaucoma   HTN  MDD   Seizure disorder  Arthritis, knee   Carpal tunnel, bilateral   Bipolar 2 disorder   Tobacco use disorder   GERD  Hyperkalemia   Urinary incontinence   Chronic pain, bilateral knees   Insomnia   Chronic constipation   Hep C   Seizures     Medications   Norvasc  Dulcolax   Lipitor   Colace   Pepcid   Neurontin   Zestril  Dilantin  Januvia   Zonegran     Surgical History   ORIF elbow   GI ERCP with sphincterotomy and stone removal 2x  Diode cyclophotocoagulation, left   Cholecystectomy   Liver biopsy     Physical Exam     Patient Vitals for the past 24 hrs:   BP Temp Temp src Pulse Resp SpO2 Height Weight   06/28/25 2145 (!) 172/74 -- -- 75 -- 98 % -- --   06/28/25 2130 (!) 167/70 -- -- 72 -- 100 % -- --   06/28/25 2128 (!) 166/67 -- -- 74 -- 99 % -- --   06/28/25 2032 (!) 151/80 98.3  F (36.8  C) Temporal 70 20 100  "% 1.575 m (5' 2\") 81.6 kg (180 lb)     Physical Exam  General: No acute distress  Head: No obvious trauma to head.  Ears, Nose, Throat:  External ears normal.  Nose normal.  No pharyngeal erythema, swelling or exudate.  Midline uvula. Moist mucus membranes.  Eyes:  Conjunctivae clear.   Neck: Normal range of motion.  Neck supple.   CV: Regular rate and rhythm.  No murmurs.      Respiratory: Effort normal and breath sounds normal.  No wheezing or crackles.   Gastrointestinal: Soft.  No distension. There is no tenderness.  There is no rigidity, no rebound and no guarding.   Musculoskeletal: Normal range of motion.  Non tender extremities to palpations. No lower extremity edema  Neuro: Alert. Moving all extremities appropriately.  Normal speech.    Skin: Skin is warm and dry.  No rash noted.   Psych: Normal mood and affect. Behavior is normal.     Diagnostics     Lab Results   Labs Ordered and Resulted from Time of ED Arrival to Time of ED Departure   ROUTINE UA WITH MICROSCOPIC REFLEX TO CULTURE - Abnormal       Result Value    Color Urine Yellow      Appearance Urine Clear      Glucose Urine Negative      Bilirubin Urine Negative      Ketones Urine Negative      Specific Gravity Urine 1.020      Blood Urine Small (*)     pH Urine 5.5      Protein Albumin Urine 10 (*)     Urobilinogen Urine Normal      Nitrite Urine Negative      Leukocyte Esterase Urine Large (*)     Bacteria Urine Few (*)     Mucus Urine Present (*)     RBC Urine 10 (*)     WBC Urine 35 (*)     Squamous Epithelials Urine 3 (*)    URINE CULTURE       Imaging   No orders to display       EKG   None    Independent Interpretation   None    ED Course      Medications Administered   Medications   oxyCODONE IR (ROXICODONE) half-tab 2.5 mg (has no administration in time range)   cephALEXin (KEFLEX) capsule 500 mg (has no administration in time range)       Procedures   Procedures     Discussion of Management   None    ED Course   ED Course as of 06/28/25 " 2158   Sat Jun 28, 2025 2044 I obtained history and examined the patient as noted above.     2136 I rechecked the patient and updated them on findings.        Additional Documentation  None    Medical Decision Making / Diagnosis     CMS Diagnoses: None    MIPS   None               MDM   Elsa Zapien is a 74 year old female presenting with her daughter with urinary frequency and incontinence.  She denies any other new concerning symptoms.  UA is concerning for UTI.  She is given a prescription for Keflex.  She has an allergy listed to penicillin but has tolerated cefdinir in the past.  She states she has an appointment with her primary doctor on Tuesday, and ask for a few tablets of oxycodone which she is normally given for her chronic pain, to last her until she sees her primary provider.  She is given 5 tablets of oxycodone.  Strict return precautions are given and she verbalizes understanding.  She is discharged home in stable condition with her daughter.    Disposition   The patient was discharged.     Diagnosis     ICD-10-CM    1. Acute UTI  N39.0            Discharge Medications   New Prescriptions    CEPHALEXIN (KEFLEX) 500 MG CAPSULE    Take 1 capsule (500 mg) by mouth 2 times daily for 5 days.    OXYCODONE (ROXICODONE) 5 MG TABLET    Take 0.5 tablets (2.5 mg) by mouth every 6 hours as needed for pain.         Scribe Disclosure:  I, Nate Pimentel, am serving as a scribe at 8:49 PM on 6/28/2025 to document services personally performed by Juan Carlos Boyce MD based on my observations and the provider's statements to me.        Juan Carlos Boyce MD  06/28/25 5140

## 2025-06-30 ENCOUNTER — RESULTS FOLLOW-UP (OUTPATIENT)
Dept: NURSING | Facility: CLINIC | Age: 75
End: 2025-06-30

## 2025-06-30 LAB — BACTERIA UR CULT: NORMAL
